# Patient Record
Sex: FEMALE | Race: BLACK OR AFRICAN AMERICAN | NOT HISPANIC OR LATINO
[De-identification: names, ages, dates, MRNs, and addresses within clinical notes are randomized per-mention and may not be internally consistent; named-entity substitution may affect disease eponyms.]

---

## 2021-05-17 PROBLEM — Z00.00 ENCOUNTER FOR PREVENTIVE HEALTH EXAMINATION: Status: ACTIVE | Noted: 2021-05-17

## 2021-05-26 ENCOUNTER — APPOINTMENT (OUTPATIENT)
Dept: ANTEPARTUM | Facility: CLINIC | Age: 26
End: 2021-05-26
Payer: MEDICAID

## 2021-05-26 ENCOUNTER — ASOB RESULT (OUTPATIENT)
Age: 26
End: 2021-05-26

## 2021-05-26 PROCEDURE — 76812 OB US DETAILED ADDL FETUS: CPT | Mod: 59

## 2021-05-26 PROCEDURE — 76811 OB US DETAILED SNGL FETUS: CPT

## 2021-06-09 ENCOUNTER — ASOB RESULT (OUTPATIENT)
Age: 26
End: 2021-06-09

## 2021-06-09 ENCOUNTER — APPOINTMENT (OUTPATIENT)
Dept: ANTEPARTUM | Facility: CLINIC | Age: 26
End: 2021-06-09
Payer: MEDICAID

## 2021-06-09 ENCOUNTER — TRANSCRIPTION ENCOUNTER (OUTPATIENT)
Age: 26
End: 2021-06-09

## 2021-06-09 PROCEDURE — 76816 OB US FOLLOW-UP PER FETUS: CPT

## 2021-06-14 ENCOUNTER — APPOINTMENT (OUTPATIENT)
Dept: PEDIATRIC CARDIOLOGY | Facility: CLINIC | Age: 26
End: 2021-06-14
Payer: MEDICAID

## 2021-06-14 PROCEDURE — 76821 MIDDLE CEREBRAL ARTERY ECHO: CPT | Mod: 59

## 2021-06-14 PROCEDURE — 76825 ECHO EXAM OF FETAL HEART: CPT

## 2021-06-14 PROCEDURE — 76827 ECHO EXAM OF FETAL HEART: CPT

## 2021-06-14 PROCEDURE — 93325 DOPPLER ECHO COLOR FLOW MAPG: CPT | Mod: 59

## 2021-06-14 PROCEDURE — 99205 OFFICE O/P NEW HI 60 MIN: CPT | Mod: 25

## 2021-06-14 PROCEDURE — 76820 UMBILICAL ARTERY ECHO: CPT

## 2021-06-14 PROCEDURE — 76827 ECHO EXAM OF FETAL HEART: CPT | Mod: 59

## 2021-06-14 PROCEDURE — 76825 ECHO EXAM OF FETAL HEART: CPT | Mod: 59

## 2021-06-23 ENCOUNTER — ASOB RESULT (OUTPATIENT)
Age: 26
End: 2021-06-23

## 2021-06-23 ENCOUNTER — APPOINTMENT (OUTPATIENT)
Dept: ANTEPARTUM | Facility: CLINIC | Age: 26
End: 2021-06-23
Payer: MEDICAID

## 2021-06-23 PROCEDURE — 76819 FETAL BIOPHYS PROFIL W/O NST: CPT | Mod: 59

## 2021-07-16 ENCOUNTER — ASOB RESULT (OUTPATIENT)
Age: 26
End: 2021-07-16

## 2021-07-16 ENCOUNTER — APPOINTMENT (OUTPATIENT)
Dept: ANTEPARTUM | Facility: CLINIC | Age: 26
End: 2021-07-16
Payer: MEDICAID

## 2021-07-16 PROCEDURE — 76816 OB US FOLLOW-UP PER FETUS: CPT | Mod: 59

## 2021-07-27 ENCOUNTER — INPATIENT (INPATIENT)
Facility: HOSPITAL | Age: 26
LOS: 0 days | Discharge: HOME | DRG: 831 | End: 2021-07-28
Attending: OBSTETRICS & GYNECOLOGY | Admitting: OBSTETRICS & GYNECOLOGY
Payer: MEDICAID

## 2021-07-27 VITALS — SYSTOLIC BLOOD PRESSURE: 117 MMHG | DIASTOLIC BLOOD PRESSURE: 68 MMHG | HEART RATE: 102 BPM

## 2021-07-27 DIAGNOSIS — O47.1 FALSE LABOR AT OR AFTER 37 COMPLETED WEEKS OF GESTATION: ICD-10-CM

## 2021-07-27 RX ORDER — ACETAMINOPHEN 500 MG
975 TABLET ORAL ONCE
Refills: 0 | Status: COMPLETED | OUTPATIENT
Start: 2021-07-27 | End: 2021-07-27

## 2021-07-27 RX ORDER — SODIUM CHLORIDE 9 MG/ML
1000 INJECTION, SOLUTION INTRAVENOUS ONCE
Refills: 0 | Status: COMPLETED | OUTPATIENT
Start: 2021-07-27 | End: 2021-07-27

## 2021-07-27 RX ADMIN — Medication 975 MILLIGRAM(S): at 23:45

## 2021-07-27 RX ADMIN — SODIUM CHLORIDE 1000 MILLILITER(S): 9 INJECTION, SOLUTION INTRAVENOUS at 23:45

## 2021-07-27 NOTE — OB PROVIDER H&P - HISTORY OF PRESENT ILLNESS
25y  @26wks w/ Mono/Di PRABHJOT presents as a transfer from [facility].  Patient initially presented to [facility] with lower back and abdominal pain x1 day.  Evaluation of  labor revealed a 1cm cervix, unchanged after re-evaluation and no contractions on toco.  Patient reported resolution of abd pain symptoms after examination.  During evaluation patient found to be febrile to 100.6 and Covid result tested positive.  Patient was asymptomatic, reported no subjective fevers, chills, lightheadedness, chest pain, SOB, nausea, vomiting, changes in urination or bowel function.  Received tylenol, 2L fluid bolus, zofran prior to transfer.    Patient transferred to [facility] for further evaluation.  Upon arrival patient reports no acute complaints.  No difficulty breathing and no obstetrical complaints.    PNC: previously uncomplicated  Sono ( @ [place]): anterior placenta, CL 3.19cm, Baby A breech 945g, Baby B breech 807g    OBHx: 2019  6#9, 2020 SAB  GYN: denies  PMH: denies  PSH: denies  Meds: denies  Allergies: NKDA

## 2021-07-27 NOTE — OB RN TRIAGE NOTE - NS_GBS_INFANT_INVASIVE_OBGYN_ALL_OB_FT
Patient states no history
no loss of consciousness, no gait abnormality, no headache, no sensory deficits, and no weakness.

## 2021-07-27 NOTE — OB PROVIDER TRIAGE NOTE - HISTORY OF PRESENT ILLNESS
25y  @26wks w/ Mono/Di PRABHJOT presents as a transfer from [facility].  Patient initially presented to [facility] with lower back and abdominal pain x1 day.  Evaluation of  labor revealed a 1cm cervix, unchanged after re-evaluation and no contractions on toco.  Patient reported resolution of abd pain symptoms after examination.  During evaluation patient found to be febrile to 100.6 and Covid result tested positive.  Patient was asymptomatic, reported no subjective fevers, chills, lightheadedness, chest pain, SOB, nausea, vomiting, changes in urination or bowel function.   25y  @26wks w/ Mono/Di PRABHJOT presents as a transfer from [facility].  Patient initially presented to [facility] with lower back and abdominal pain x1 day.  Evaluation of  labor revealed a 1cm cervix, unchanged after re-evaluation and no contractions on toco.  Patient reported resolution of abd pain symptoms after examination.  During evaluation patient found to be febrile to 100.6 and Covid result tested positive.  Patient was asymptomatic, reported no subjective fevers, chills, lightheadedness, chest pain, SOB, nausea, vomiting, changes in urination or bowel function.  Received tylenol, 2L fluid bolus, zofran prior to transfer.    Patient transferred to [facility] for further evaluation.  Upon arrival patient reports no acute complaints.  No difficulty breathing and no obstetrical complaints.    PNC: previously uncomplicated  Sono ( @ [place]): anterior placenta, CL 3.19cm, Baby A breech 945g, Baby B breech 807g    OBHx: 2019  6#9, 2020 SAB  GYN: denies  PMH: denies  PSH: denies  Meds: denies  Allergies: NKDA

## 2021-07-27 NOTE — OB PROVIDER TRIAGE NOTE - NSOBPROVIDERNOTE_OBGYN_ALL_OB_FT
25y  @26wks w/ Mono/Di TIUP transfer from [facility] after being evaluated for PTL.  No concern for PTL, however patient found to be Covid positive with noted fever at 100.6, vital signs otherwise stable.  Patient received fluid resuscitation and transferred to [facility] for further care.  VSS on arrival.  Patient with no acute complaints and breathing comfortably on room air.  Fetal status reassuring.  Mild disease is defined as flu-like symptoms, such as fever, cough, myalgias, and anosmia without dyspnea, shortness of breath, or abnormal chest imaging.  No indication for inpatient hospitalization at this time.    Plan:  - continue to monitor overnight for worsening signs/symptoms  - Tylenol PRN fever  - IV fluid resuscitation as needed  - Monoclonal antibodies indicated in mil-mod COVID-19 disease weighing at least 40kg and who are at high risk for severe disease, defined in these cohorts as patients who meet at least one of the following:       - BMI >35       - chronic kidney disease       - diabetes       - immunosuppressive treatment    d[facility] Peter [facility]

## 2021-07-27 NOTE — OB PROVIDER H&P - TIME BILLING
/Attendiny/o  @26.1wks w/ mono/di twins and was being evaluated and r/o for PTL and was transferred from [facility]. Pt admitted 2nd to symptomatic COVID with fevers.  I have read and reviewed the above Resident's note, reviewed the chart, and pt discussed with MFM and I agree with the assessment and plan.    Pt denies any complaints at this time.  VSS and currently afebrile; Tmax 100.9 @11:20pm on .    Plan is to continue to monitor on L&D 2nd to fevers and to monitor for any symptoms or worsening; Tylenol for fever as needed.  Pt to also get sono this am and to be seen by M.    Dr. Mei

## 2021-07-27 NOTE — OB PROVIDER H&P - ASSESSMENT
25y  @26wks w/ Mono/Di TIUP transfer from [facility] after being evaluated for PTL.  No concern for PTL, however patient found to be Covid positive with noted fever at 100.6, vital signs otherwise stable.  Patient received fluid resuscitation and transferred to [facility] for further care.  VSS on arrival.  Patient with no acute complaints and breathing comfortably on room air.  Fetal status reassuring.  Mild disease is defined as flu-like symptoms, such as fever, cough, myalgias, and anosmia without dyspnea, shortness of breath, or abnormal chest imaging.  No indication for inpatient hospitalization at this time.    Plan:  - continue to monitor overnight for worsening signs/symptoms  - Tylenol PRN fever  - IV fluid resuscitation as needed  - Monoclonal antibodies indicated in mil-mod COVID-19 disease weighing at least 40kg and who are at high risk for severe disease, defined in these cohorts as patients who meet at least one of the following:       - BMI >35       - chronic kidney disease       - diabetes       - immunosuppressive treatment    d/w Dr. Peter, Dr. Vasquez and Dr. Pam Araya, PGY3 25y  @26wks w/ Mono/Di TIUP transfer from [facility] after being evaluated for PTL.  No concern for PTL, however patient found to be Covid positive with noted fever at 100.6, vital signs otherwise stable.  Patient received fluid resuscitation and transferred to [facility] for further care.  VSS on arrival.  Patient with no acute complaints and breathing comfortably on room air.  Fetal status reassuring.  Mild disease is defined as flu-like symptoms, such as fever, cough, myalgias, and anosmia without dyspnea, shortness of breath, or abnormal chest imaging.  No indication for inpatient hospitalization at this time.    Plan:  - continue to monitor overnight for worsening signs/symptoms  - Tylenol PRN fever  - IV fluid resuscitation as needed  -Pt possible candidate Monoclonal antibodies indicated in mil-mod COVID-19 disease weighing at least 40kg and who are at high risk for severe disease, defined in these cohorts as patients who meet at least one of the following:       - BMI >35       - chronic kidney disease       - diabetes       - immunosuppressive treatment    d/w Dr. Peter, Dr. Vasquez and Dr. Pam Araya, PGY3

## 2021-07-28 ENCOUNTER — TRANSCRIPTION ENCOUNTER (OUTPATIENT)
Age: 26
End: 2021-07-28

## 2021-07-28 ENCOUNTER — APPOINTMENT (OUTPATIENT)
Dept: ANTEPARTUM | Facility: CLINIC | Age: 26
End: 2021-07-28

## 2021-07-28 ENCOUNTER — ASOB RESULT (OUTPATIENT)
Age: 26
End: 2021-07-28

## 2021-07-28 VITALS — HEART RATE: 100 BPM | TEMPERATURE: 100 F | SYSTOLIC BLOOD PRESSURE: 97 MMHG | DIASTOLIC BLOOD PRESSURE: 66 MMHG

## 2021-07-28 LAB
ALBUMIN SERPL ELPH-MCNC: 3 G/DL — LOW (ref 3.3–5)
ALP SERPL-CCNC: 102 U/L — SIGNIFICANT CHANGE UP (ref 40–120)
ALT FLD-CCNC: 18 U/L — SIGNIFICANT CHANGE UP (ref 10–45)
ANION GAP SERPL CALC-SCNC: 11 MMOL/L — SIGNIFICANT CHANGE UP (ref 5–17)
AST SERPL-CCNC: 28 U/L — SIGNIFICANT CHANGE UP (ref 10–40)
BILIRUB SERPL-MCNC: 0.4 MG/DL — SIGNIFICANT CHANGE UP (ref 0.2–1.2)
BLD GP AB SCN SERPL QL: POSITIVE — SIGNIFICANT CHANGE UP
BUN SERPL-MCNC: <4 MG/DL — LOW (ref 7–23)
CALCIUM SERPL-MCNC: 8.8 MG/DL — SIGNIFICANT CHANGE UP (ref 8.4–10.5)
CHLORIDE SERPL-SCNC: 102 MMOL/L — SIGNIFICANT CHANGE UP (ref 96–108)
CO2 SERPL-SCNC: 20 MMOL/L — LOW (ref 22–31)
COVID-19 SPIKE DOMAIN AB INTERP: NEGATIVE — SIGNIFICANT CHANGE UP
COVID-19 SPIKE DOMAIN ANTIBODY RESULT: 0.4 U/ML — SIGNIFICANT CHANGE UP
CREAT SERPL-MCNC: 0.57 MG/DL — SIGNIFICANT CHANGE UP (ref 0.5–1.3)
GLUCOSE SERPL-MCNC: 95 MG/DL — SIGNIFICANT CHANGE UP (ref 70–99)
HCT VFR BLD CALC: 28 % — LOW (ref 34.5–45)
HGB BLD-MCNC: 9.5 G/DL — LOW (ref 11.5–15.5)
HIV 1 & 2 AB SERPL IA.RAPID: SIGNIFICANT CHANGE UP
MCHC RBC-ENTMCNC: 33.9 GM/DL — SIGNIFICANT CHANGE UP (ref 32–36)
MCHC RBC-ENTMCNC: 34.2 PG — HIGH (ref 27–34)
MCV RBC AUTO: 100.7 FL — HIGH (ref 80–100)
NRBC # BLD: 0 /100 WBCS — SIGNIFICANT CHANGE UP (ref 0–0)
PLATELET # BLD AUTO: 191 K/UL — SIGNIFICANT CHANGE UP (ref 150–400)
POTASSIUM SERPL-MCNC: 3.3 MMOL/L — LOW (ref 3.5–5.3)
POTASSIUM SERPL-SCNC: 3.3 MMOL/L — LOW (ref 3.5–5.3)
PROT SERPL-MCNC: 6 G/DL — SIGNIFICANT CHANGE UP (ref 6–8.3)
RBC # BLD: 2.78 M/UL — LOW (ref 3.8–5.2)
RBC # FLD: 13 % — SIGNIFICANT CHANGE UP (ref 10.3–14.5)
RH IG SCN BLD-IMP: POSITIVE — SIGNIFICANT CHANGE UP
SARS-COV-2 IGG+IGM SERPL QL IA: 0.4 U/ML — SIGNIFICANT CHANGE UP
SARS-COV-2 IGG+IGM SERPL QL IA: NEGATIVE — SIGNIFICANT CHANGE UP
SARS-COV-2 RNA SPEC QL NAA+PROBE: DETECTED
SODIUM SERPL-SCNC: 133 MMOL/L — LOW (ref 135–145)
TSH SERPL-MCNC: 0.21 UIU/ML — LOW (ref 0.27–4.2)
WBC # BLD: 5.89 K/UL — SIGNIFICANT CHANGE UP (ref 3.8–10.5)
WBC # FLD AUTO: 5.89 K/UL — SIGNIFICANT CHANGE UP (ref 3.8–10.5)

## 2021-07-28 PROCEDURE — 84443 ASSAY THYROID STIM HORMONE: CPT

## 2021-07-28 PROCEDURE — 85027 COMPLETE CBC AUTOMATED: CPT

## 2021-07-28 PROCEDURE — 80053 COMPREHEN METABOLIC PANEL: CPT

## 2021-07-28 PROCEDURE — 87635 SARS-COV-2 COVID-19 AMP PRB: CPT

## 2021-07-28 PROCEDURE — 93005 ELECTROCARDIOGRAM TRACING: CPT

## 2021-07-28 PROCEDURE — 86880 COOMBS TEST DIRECT: CPT

## 2021-07-28 PROCEDURE — 93010 ELECTROCARDIOGRAM REPORT: CPT

## 2021-07-28 PROCEDURE — 86870 RBC ANTIBODY IDENTIFICATION: CPT

## 2021-07-28 PROCEDURE — 86905 BLOOD TYPING RBC ANTIGENS: CPT

## 2021-07-28 PROCEDURE — 86077 PHYS BLOOD BANK SERV XMATCH: CPT

## 2021-07-28 PROCEDURE — 86850 RBC ANTIBODY SCREEN: CPT

## 2021-07-28 PROCEDURE — 86769 SARS-COV-2 COVID-19 ANTIBODY: CPT

## 2021-07-28 PROCEDURE — 86901 BLOOD TYPING SEROLOGIC RH(D): CPT

## 2021-07-28 PROCEDURE — 59025 FETAL NON-STRESS TEST: CPT

## 2021-07-28 PROCEDURE — 86922 COMPATIBILITY TEST ANTIGLOB: CPT

## 2021-07-28 PROCEDURE — 86900 BLOOD TYPING SEROLOGIC ABO: CPT

## 2021-07-28 PROCEDURE — 86703 HIV-1/HIV-2 1 RESULT ANTBDY: CPT

## 2021-07-28 RX ORDER — SODIUM CHLORIDE 9 MG/ML
1000 INJECTION, SOLUTION INTRAVENOUS
Refills: 0 | Status: DISCONTINUED | OUTPATIENT
Start: 2021-07-28 | End: 2021-07-28

## 2021-07-28 RX ADMIN — Medication 975 MILLIGRAM(S): at 00:45

## 2021-07-28 NOTE — DISCHARGE NOTE ANTEPARTUM - CARE PLAN
Principal Discharge DX:	COVID-19 affecting pregnancy in second trimester  Goal:	Recover  Assessment and plan of treatment:	Continue with symptomatic management  - tylenol PRN for fevers  - If you have any chest pain or shortness of breath please contact your provider.

## 2021-07-28 NOTE — CHART NOTE - NSCHARTNOTEFT_GEN_A_CORE
Requested by OB to consult on a 26yo  @26w1d twin gestation transfer from [facility] after being r/o for PTL found to be COVID+ and febrile and tachycardic.  Mother was consulted over the phone and as follows:     1. NICU team is going to be present at the time of delivery, and her babies will be place under the warmer and immediately evaluated.    2. Due to severe prematurity the babies will need respiratory support. The babies will require respiratory support either in the form of CPAP, intubation, surfactant administration and mechanical ventilation. Due to prematurity of the lungs, there is a likelihood that the babies could sustain damage to the lungs while on the ventilator, but that all precautions will be taken to try to minimize the effects. The babies is also at risk for chronic lung disease.    3. The heart may not be strong enough to maintain normal blood pressures. IV fluid and  medications will be given to help maintain normal blood pressure as needed.     5. Due to the size of the babies and prematurity, they will need multiple blood transfusions just from routine blood draws and that the baby will not be producing enough of their own blood.   In order to have IV access, central lines will be placed through the umbilical vessels  and later long term lines such as PICC lines would be needed. Through these same lines the babies would receive IV fluids, medications and nutrition.    6. Due to severe prematurity of the babies their immune system will not be strong enough to fight infections. They will be screened for infections and treated with antibiotics as needed. They may receive other courses of antibiotics during their hospital course if an infection is suspected.     7. Due to severe prematurity, feeding will not be initiated right away. In the mean time, colostrum care is provided. Once feeds are initiated, it will be given through an orogastric tube and feeds will be advanced slowly as tolerated. The importance of maternal breast milk as the exclusive source of nutrition was discussed at length    8. The potential for bleeding was discussed with an emphasis on bleeding in the brain. The fragility of the vessels in the brain at this gestation is severe and that with small changes in blood pressure could result in bleeding. This bleeding could cause developmental delays and morbidity. This type of bleeding will be screened with interval head ultrasounds.    10. The babies are expected to remain in the NICU for at least until they are closer to full term but largely depends on the course and complications. Expected length of stay is about 10 - 12 weeks.    Mrs. Gallegos had the chance to ask questions and all questions were answered. Should she have more questions, she was encouraged to contact the NICU      Thank you for the consult and please inform us of any changes in her status Requested by OB to consult on a 24yo  @26w1d twin gestation transfer from [facility] after being r/o for PTL found to be COVID+ and febrile and tachycardic.  Mother was consulted over the phone and as follows:    1. NICU team is going to be present at the time of delivery, and her babies will be place under the warmer and immediately evaluated.    2. Due to prematurity of their lungs, the babies will require respiratory support either in the form of CPAP, or intubation, surfactant administration and mechanical ventilation. There is a likelihood that the babies could sustain damage to the lungs while on the ventilator, but that all precautions will be taken to try to minimize the effects. The babies are also at risk for chronic lung disease.    3. The heart may not be strong enough to maintain normal blood pressures. IV fluid and  medications will be given to help maintain normal blood pressure as needed.     4. Due to prematurity and not be able to produce enough blood cells and also from routine blood draws, they are at risk for significant anemia that may require blood transfusion.     5. In order to have IV access, central lines will be placed through the umbilical vessels  and later on long term lines such as PICC lines would be needed. Through these same lines the babies would receive IV fluids, medications and nutrition.    6. The babies may need to be screened for infection as need and treated. They may receive other courses of antibiotics during their hospital course if an infection is suspected.     7. Due to severe prematurity, feeding will not be initiated right away. In the mean time, colostrum care will be provided. Once feeds are initiated, it will be given through an orogastric tube and feeds will be advanced slowly as tolerated. The importance of maternal breast milk as the exclusive source of nutrition was discussed at length    8. The potential for bleeding was discussed with an emphasis on bleeding in the brain. Due to fragility of blood vessels at this gestation, small changes in blood pressure could result in bleeding. This bleeding could cause developmental delays and morbidity. This type of bleeding will be screened with interval head ultrasounds.    9. The babies are expected to remain in the NICU for at least until they are closer to full term but largely depends on the course and complications. Expected length of stay is about 10 - 12 weeks.    Mrs. Gallegos had the chance to ask questions and all questions were answered. Should she have more questions, she was encouraged to contact the NICU      Thank you for the consult and please inform us of any changes in her status

## 2021-07-28 NOTE — DISCHARGE NOTE ANTEPARTUM - MEDICATION SUMMARY - MEDICATIONS TO TAKE
I will START or STAY ON the medications listed below when I get home from the hospital:    Prenatal Multivitamins with Folic Acid 1 mg oral tablet  -- 1 tab(s) by mouth once a day  -- Indication: For Pregnancy

## 2021-07-28 NOTE — CHART NOTE - NSCHARTNOTEFT_GEN_A_CORE
OB Chart Note    A: BPP 8/8 breech MVP 3.4  B: BPP 8/8 breech MVP 4.2    Pt cleared for discharge  dw Dr.Hirschberg Daniil Khaitov PGY3

## 2021-07-28 NOTE — CHART NOTE - NSCHARTNOTEFT_GEN_A_CORE
R3 Chart Note    Patient re-evaluated at bedside after noted T 38.3 @ 2321.  Patient remains tachycardic w/ -120.  EKG sinus tachycardia.  Resting comfortably in bed with no new symptomatic complaints.    Discussed case w/ Medicine hospitalist who recommended checking TSH value.  Medicine team aware of patient, and formal consult will be placed if change in clinical status.    Plan:  - c/w IV fluid hydration  - Tylenol PRN  - monitor VS  - f/u labs including CBC, CMP, TSH, T&S    d/w Dr. Rome Araya, PGY3

## 2021-07-28 NOTE — PROGRESS NOTE ADULT - ASSESSMENT
24yo  @26w1d transfer from [facility] after being r/o for PTL found to be COVID+ and febrile, tachycardic and fluid responsive. Currently saturating well on room air with no acute complaints.  - Tylenol PRN for fever  - LR@125  - AM MFM consult  - Continuous pulse ox  - Reg Diet    Bill Chase PGY3    26yo  @26w1d transfer from [facility] after being r/o for PTL found to be COVID+ and febrile, tachycardic and fluid responsive. Currently saturating well on room air with no acute complaints.  - Tylenol PRN for fever  - LR@125  - AM MFM consult  - Continuous pulse ox  - Reg Diet    Bill Chase PGY3     *** MFM Assessment ***   Patient seen and examined with Dr. Orozco. 26yo  @26w1d with mono-di twins, transfer from [facility] after being r/o for PTL found to be COVID+ and febrile, tachycardic and fluid responsive. Patient afebrile at this time, satting 100% on RA and with no complaints. She states that overall she is feeling very well. She denies any contractions, VB or LOF. Denies any difficulty breathing or chest pain and endorses good fetal movement. BPP at bedside 8/8 X 2 and 2 fetal bladders noted. Last growth Patient to be discharged home with outpatient follow up.     Carly Hirschberg, JIM Fellow

## 2021-07-28 NOTE — DISCHARGE NOTE ANTEPARTUM - PLAN OF CARE
Recover Continue with symptomatic management  - tylenol PRN for fevers  - If you have any chest pain or shortness of breath please contact your provider.

## 2021-07-28 NOTE — DISCHARGE NOTE ANTEPARTUM - HOSPITAL COURSE
25y  @26wks w/ Mono/Di TIUP transfer from [facility] after being evaluated for PTL.  No concern for PTL, however patient found to be Covid positive with noted fever at 100.6, vital signs otherwise stable.  Patient received fluid resuscitation and transferred to [facility] for further care.  VSS on arrival.  Patient with no acute complaints and breathing comfortably on room air.  Fetal status reassuring. Overnight patient was asymptomatic and VSS. Plan for outpatient management of COVID.

## 2021-07-28 NOTE — DISCHARGE NOTE ANTEPARTUM - CARE PROVIDER_API CALL
Tucker Ruiz  Obstetrics and Gynecology  [address] [place] [address]  Phone: [phone]  Fax: [phone]  Follow Up Time: 2 weeks

## 2021-07-28 NOTE — DISCHARGE NOTE ANTEPARTUM - PATIENT PORTAL LINK FT
You can access the FollowMyHealth Patient Portal offered by [facility] by registering at the following website: [email or web address]. By joining [employer]’s FollowMyHealth portal, you will also be able to view your health information using other applications (apps) compatible with our system.

## 2021-07-29 ENCOUNTER — NON-APPOINTMENT (OUTPATIENT)
Age: 26
End: 2021-07-29

## 2021-07-29 RX ORDER — ACETAMINOPHEN 500 MG/1
500 TABLET ORAL
Refills: 0 | Status: ACTIVE | COMMUNITY
Start: 2021-07-29

## 2021-08-09 ENCOUNTER — APPOINTMENT (OUTPATIENT)
Dept: ANTEPARTUM | Facility: CLINIC | Age: 26
End: 2021-08-09
Payer: MEDICAID

## 2021-08-09 PROCEDURE — 76816 OB US FOLLOW-UP PER FETUS: CPT

## 2021-08-17 ENCOUNTER — INPATIENT (INPATIENT)
Facility: HOSPITAL | Age: 26
LOS: 0 days | Discharge: HOME | DRG: 833 | End: 2021-08-18
Attending: OBSTETRICS & GYNECOLOGY | Admitting: OBSTETRICS & GYNECOLOGY
Payer: COMMERCIAL

## 2021-08-17 ENCOUNTER — ASOB RESULT (OUTPATIENT)
Age: 26
End: 2021-08-17

## 2021-08-17 ENCOUNTER — APPOINTMENT (OUTPATIENT)
Dept: ANTEPARTUM | Facility: CLINIC | Age: 26
End: 2021-08-17
Payer: MEDICAID

## 2021-08-17 VITALS
HEART RATE: 85 BPM | TEMPERATURE: 98 F | DIASTOLIC BLOOD PRESSURE: 69 MMHG | SYSTOLIC BLOOD PRESSURE: 103 MMHG | RESPIRATION RATE: 18 BRPM

## 2021-08-17 DIAGNOSIS — O26.899 OTHER SPECIFIED PREGNANCY RELATED CONDITIONS, UNSPECIFIED TRIMESTER: ICD-10-CM

## 2021-08-17 DIAGNOSIS — Z3A.00 WEEKS OF GESTATION OF PREGNANCY NOT SPECIFIED: ICD-10-CM

## 2021-08-17 LAB
APPEARANCE UR: CLEAR — SIGNIFICANT CHANGE UP
BACTERIA # UR AUTO: PRESENT /HPF
BASOPHILS # BLD AUTO: 0.04 K/UL — SIGNIFICANT CHANGE UP (ref 0–0.2)
BASOPHILS NFR BLD AUTO: 0.6 % — SIGNIFICANT CHANGE UP (ref 0–2)
BILIRUB UR-MCNC: ABNORMAL
BLD GP AB SCN SERPL QL: NEGATIVE — SIGNIFICANT CHANGE UP
COLOR SPEC: ABNORMAL
COMMENT - URINE: SIGNIFICANT CHANGE UP
DIFF PNL FLD: NEGATIVE — SIGNIFICANT CHANGE UP
EOSINOPHIL # BLD AUTO: 0.1 K/UL — SIGNIFICANT CHANGE UP (ref 0–0.5)
EOSINOPHIL NFR BLD AUTO: 1.4 % — SIGNIFICANT CHANGE UP (ref 0–6)
EPI CELLS # UR: ABNORMAL /HPF (ref 0–5)
GLUCOSE UR QL: NEGATIVE — SIGNIFICANT CHANGE UP
HCT VFR BLD CALC: 30.8 % — LOW (ref 34.5–45)
HGB BLD-MCNC: 10.4 G/DL — LOW (ref 11.5–15.5)
IMM GRANULOCYTES NFR BLD AUTO: 0.3 % — SIGNIFICANT CHANGE UP (ref 0–1.5)
KETONES UR-MCNC: 15 MG/DL
LEUKOCYTE ESTERASE UR-ACNC: NEGATIVE — SIGNIFICANT CHANGE UP
LYMPHOCYTES # BLD AUTO: 1.84 K/UL — SIGNIFICANT CHANGE UP (ref 1–3.3)
LYMPHOCYTES # BLD AUTO: 25.8 % — SIGNIFICANT CHANGE UP (ref 13–44)
MCHC RBC-ENTMCNC: 33.7 PG — SIGNIFICANT CHANGE UP (ref 27–34)
MCHC RBC-ENTMCNC: 33.8 GM/DL — SIGNIFICANT CHANGE UP (ref 32–36)
MCV RBC AUTO: 99.7 FL — SIGNIFICANT CHANGE UP (ref 80–100)
MONOCYTES # BLD AUTO: 0.47 K/UL — SIGNIFICANT CHANGE UP (ref 0–0.9)
MONOCYTES NFR BLD AUTO: 6.6 % — SIGNIFICANT CHANGE UP (ref 2–14)
NEUTROPHILS # BLD AUTO: 4.66 K/UL — SIGNIFICANT CHANGE UP (ref 1.8–7.4)
NEUTROPHILS NFR BLD AUTO: 65.3 % — SIGNIFICANT CHANGE UP (ref 43–77)
NITRITE UR-MCNC: SIGNIFICANT CHANGE UP
NRBC # BLD: 0 /100 WBCS — SIGNIFICANT CHANGE UP (ref 0–0)
PH UR: 7 — SIGNIFICANT CHANGE UP (ref 5–8)
PLATELET # BLD AUTO: 239 K/UL — SIGNIFICANT CHANGE UP (ref 150–400)
PROT UR-MCNC: ABNORMAL MG/DL
RBC # BLD: 3.09 M/UL — LOW (ref 3.8–5.2)
RBC # FLD: 12.8 % — SIGNIFICANT CHANGE UP (ref 10.3–14.5)
RBC CASTS # UR COMP ASSIST: < 5 /HPF — SIGNIFICANT CHANGE UP
RH IG SCN BLD-IMP: POSITIVE — SIGNIFICANT CHANGE UP
SP GR SPEC: 1.02 — SIGNIFICANT CHANGE UP (ref 1–1.03)
UROBILINOGEN FLD QL: >=8 E.U./DL
WBC # BLD: 7.13 K/UL — SIGNIFICANT CHANGE UP (ref 3.8–10.5)
WBC # FLD AUTO: 7.13 K/UL — SIGNIFICANT CHANGE UP (ref 3.8–10.5)
WBC UR QL: < 5 /HPF — SIGNIFICANT CHANGE UP

## 2021-08-17 PROCEDURE — 76819 FETAL BIOPHYS PROFIL W/O NST: CPT

## 2021-08-17 RX ORDER — ACETAMINOPHEN 500 MG
650 TABLET ORAL EVERY 6 HOURS
Refills: 0 | Status: DISCONTINUED | OUTPATIENT
Start: 2021-08-17 | End: 2021-08-18

## 2021-08-17 RX ORDER — FOLIC ACID 0.8 MG
1 TABLET ORAL ONCE
Refills: 0 | Status: DISCONTINUED | OUTPATIENT
Start: 2021-08-17 | End: 2021-08-17

## 2021-08-17 RX ORDER — FERROUS SULFATE 325(65) MG
325 TABLET ORAL ONCE
Refills: 0 | Status: DISCONTINUED | OUTPATIENT
Start: 2021-08-17 | End: 2021-08-17

## 2021-08-17 RX ORDER — SODIUM CHLORIDE 9 MG/ML
1000 INJECTION, SOLUTION INTRAVENOUS
Refills: 0 | Status: DISCONTINUED | OUTPATIENT
Start: 2021-08-17 | End: 2021-08-18

## 2021-08-17 RX ORDER — PHENAZOPYRIDINE HCL 100 MG
200 TABLET ORAL EVERY 8 HOURS
Refills: 0 | Status: COMPLETED | OUTPATIENT
Start: 2021-08-17 | End: 2021-08-18

## 2021-08-17 RX ADMIN — Medication 650 MILLIGRAM(S): at 22:00

## 2021-08-17 RX ADMIN — Medication 650 MILLIGRAM(S): at 20:26

## 2021-08-17 RX ADMIN — Medication 200 MILLIGRAM(S): at 20:24

## 2021-08-18 ENCOUNTER — TRANSCRIPTION ENCOUNTER (OUTPATIENT)
Age: 26
End: 2021-08-18

## 2021-08-18 VITALS
RESPIRATION RATE: 16 BRPM | OXYGEN SATURATION: 100 % | HEART RATE: 93 BPM | TEMPERATURE: 99 F | DIASTOLIC BLOOD PRESSURE: 68 MMHG | SYSTOLIC BLOOD PRESSURE: 104 MMHG

## 2021-08-18 LAB
COVID-19 SPIKE DOMAIN AB INTERP: POSITIVE
COVID-19 SPIKE DOMAIN ANTIBODY RESULT: 189 U/ML — HIGH
SARS-COV-2 IGG+IGM SERPL QL IA: 189 U/ML — HIGH
SARS-COV-2 IGG+IGM SERPL QL IA: POSITIVE
T PALLIDUM AB TITR SER: NEGATIVE — SIGNIFICANT CHANGE UP

## 2021-08-18 PROCEDURE — 87798 DETECT AGENT NOS DNA AMP: CPT

## 2021-08-18 PROCEDURE — 99214 OFFICE O/P EST MOD 30 MIN: CPT

## 2021-08-18 PROCEDURE — 86850 RBC ANTIBODY SCREEN: CPT

## 2021-08-18 PROCEDURE — 87491 CHLMYD TRACH DNA AMP PROBE: CPT

## 2021-08-18 PROCEDURE — 85025 COMPLETE CBC W/AUTO DIFF WBC: CPT

## 2021-08-18 PROCEDURE — 87186 SC STD MICRODIL/AGAR DIL: CPT

## 2021-08-18 PROCEDURE — 87081 CULTURE SCREEN ONLY: CPT

## 2021-08-18 PROCEDURE — 86900 BLOOD TYPING SEROLOGIC ABO: CPT

## 2021-08-18 PROCEDURE — 87591 N.GONORRHOEAE DNA AMP PROB: CPT

## 2021-08-18 PROCEDURE — 86901 BLOOD TYPING SEROLOGIC RH(D): CPT

## 2021-08-18 PROCEDURE — 86769 SARS-COV-2 COVID-19 ANTIBODY: CPT

## 2021-08-18 PROCEDURE — 87086 URINE CULTURE/COLONY COUNT: CPT

## 2021-08-18 PROCEDURE — 86780 TREPONEMA PALLIDUM: CPT

## 2021-08-18 PROCEDURE — 87661 TRICHOMONAS VAGINALIS AMPLIF: CPT

## 2021-08-18 PROCEDURE — 36415 COLL VENOUS BLD VENIPUNCTURE: CPT

## 2021-08-18 PROCEDURE — 81001 URINALYSIS AUTO W/SCOPE: CPT

## 2021-08-18 PROCEDURE — 87801 DETECT AGNT MULT DNA AMPLI: CPT

## 2021-08-18 RX ORDER — ONDANSETRON 8 MG/1
8 TABLET, FILM COATED ORAL ONCE
Refills: 0 | Status: COMPLETED | OUTPATIENT
Start: 2021-08-18 | End: 2021-08-18

## 2021-08-18 RX ORDER — ACETAMINOPHEN 500 MG
2 TABLET ORAL
Qty: 0 | Refills: 0 | DISCHARGE
Start: 2021-08-18

## 2021-08-18 RX ADMIN — ONDANSETRON 8 MILLIGRAM(S): 8 TABLET, FILM COATED ORAL at 16:59

## 2021-08-18 RX ADMIN — Medication 200 MILLIGRAM(S): at 06:39

## 2021-08-18 NOTE — DISCHARGE NOTE ANTEPARTUM - CARE PROVIDER_API CALL
Roxanna Montes)  Obstetrics and Gynecology  [address] [place] [address]  Phone: [phone]  Fax: [phone]  Follow Up Time:

## 2021-08-18 NOTE — DISCHARGE NOTE ANTEPARTUM - PLAN OF CARE
We monitored you in the hospital for >24hrs and did not see any signs that you are going into  labor at this time. You are at high risk of  delivery given your short cervix and the fact that you have a twin pregnancy. Please follow-up next week at our ultrasound unit and in 2 weeks with your OBGYN.   Reminders:  - Nothing in vagina - no intercourse, no tampons.  - Avoid strenuous activity and heavy lifting.  - Showering is ok.  - Call the office sooner if you develop any fever, heavy bleeding, leakage of fluid, changes to how you feel the baby move or severe pain.  Go to the closest emergency room for any of these symptoms if you are not able to contact your doctor.

## 2021-08-18 NOTE — DISCHARGE NOTE ANTEPARTUM - CARE PLAN
1 Principal Discharge DX:	Short cervix   Principal Discharge DX:	Short cervix  Assessment and plan of treatment:	We monitored you in the hospital for >24hrs and did not see any signs that you are going into  labor at this time. You are at high risk of  delivery given your short cervix and the fact that you have a twin pregnancy. Please follow-up next week at our ultrasound unit and in 2 weeks with your OBGYN.   Reminders:  - Nothing in vagina - no intercourse, no tampons.  - Avoid strenuous activity and heavy lifting.  - Showering is ok.  - Call the office sooner if you develop any fever, heavy bleeding, leakage of fluid, changes to how you feel the baby move or severe pain.  Go to the closest emergency room for any of these symptoms if you are not able to contact your doctor.

## 2021-08-18 NOTE — DISCHARGE NOTE ANTEPARTUM - HOSPITAL COURSE
Pt is a  at 29wks sent from  for short, unmeasurable cervix, and pelvic pain. Pt was monitored for over 24hrs with no signs of labor: Speculum exam notable for closed cervix, pelvic pain was present before admission, did not worsen or become intermittent in nature, was related to movement and was positional, and tocometer did not  clear contractions. No indications for repeat cervical lengths at this time. Pt is hemodynamically stable and ready for discharge home with clear, strict return precautions. Pt is reliable and presents to clinic each week. She will follow-up on Monday at our ultrasound unit for further monitoring. No indication to delivery at this time.

## 2021-08-18 NOTE — DISCHARGE NOTE ANTEPARTUM - PATIENT PORTAL LINK FT
You can access the FollowMyHealth Patient Portal offered by [facility] by registering at the following website: [email or web address]. By joining [employer]’s FollowMyHealth portal, you will also be able to view your health information using other applications (apps) compatible with our system.

## 2021-08-18 NOTE — DISCHARGE NOTE ANTEPARTUM - MEDICATION SUMMARY - MEDICATIONS TO TAKE
I will START or STAY ON the medications listed below when I get home from the hospital:    acetaminophen 325 mg oral tablet  -- 2 tab(s) by mouth every 6 hours, As needed, Mild Pain (1 - 3)  -- Indication: For Pain    Prenatal Multivitamins with Folic Acid 1 mg oral tablet  -- 1 tab(s) by mouth once a day  -- Indication: For Pregnancy

## 2021-08-19 LAB
A VAGINAE DNA VAG QL NAA+PROBE: SIGNIFICANT CHANGE UP
BVAB2 DNA VAG QL NAA+PROBE: ABNORMAL
C ALBICANS DNA VAG QL NAA+PROBE: NEGATIVE — SIGNIFICANT CHANGE UP
C GLABRATA DNA VAG QL NAA+PROBE: NEGATIVE — SIGNIFICANT CHANGE UP
C KRUSEI DNA VAG QL NAA+PROBE: NEGATIVE — SIGNIFICANT CHANGE UP
C LUSITANIAE DNA VAG QL NAA+PROBE: NEGATIVE — SIGNIFICANT CHANGE UP
C TRACH RRNA SPEC QL NAA+PROBE: NEGATIVE — SIGNIFICANT CHANGE UP
CULTURE RESULTS: SIGNIFICANT CHANGE UP
MEGA1 DNA VAG QL NAA+PROBE: ABNORMAL
N GONORRHOEA RRNA SPEC QL NAA+PROBE: NEGATIVE — SIGNIFICANT CHANGE UP
SPECIMEN SOURCE: SIGNIFICANT CHANGE UP
T VAGINALIS RRNA SPEC QL NAA+PROBE: NEGATIVE — SIGNIFICANT CHANGE UP

## 2021-08-21 DIAGNOSIS — Z79.899 OTHER LONG TERM (CURRENT) DRUG THERAPY: ICD-10-CM

## 2021-08-21 DIAGNOSIS — O26.873 CERVICAL SHORTENING, THIRD TRIMESTER: ICD-10-CM

## 2021-08-21 DIAGNOSIS — Z3A.28 28 WEEKS GESTATION OF PREGNANCY: ICD-10-CM

## 2021-08-21 DIAGNOSIS — O99.891 OTHER SPECIFIED DISEASES AND CONDITIONS COMPLICATING PREGNANCY: ICD-10-CM

## 2021-08-21 DIAGNOSIS — Z28.09 IMMUNIZATION NOT CARRIED OUT BECAUSE OF OTHER CONTRAINDICATION: ICD-10-CM

## 2021-08-21 DIAGNOSIS — R10.2 PELVIC AND PERINEAL PAIN: ICD-10-CM

## 2021-08-21 DIAGNOSIS — O30.033 TWIN PREGNANCY, MONOCHORIONIC/DIAMNIOTIC, THIRD TRIMESTER: ICD-10-CM

## 2021-08-21 DIAGNOSIS — O36.5931 MATERNAL CARE FOR OTHER KNOWN OR SUSPECTED POOR FETAL GROWTH, THIRD TRIMESTER, FETUS 1: ICD-10-CM

## 2021-08-21 DIAGNOSIS — O36.5932 MATERNAL CARE FOR OTHER KNOWN OR SUSPECTED POOR FETAL GROWTH, THIRD TRIMESTER, FETUS 2: ICD-10-CM

## 2021-08-23 ENCOUNTER — APPOINTMENT (OUTPATIENT)
Dept: ANTEPARTUM | Facility: CLINIC | Age: 26
End: 2021-08-23

## 2021-08-24 ENCOUNTER — ASOB RESULT (OUTPATIENT)
Age: 26
End: 2021-08-24

## 2021-08-24 ENCOUNTER — APPOINTMENT (OUTPATIENT)
Dept: ANTEPARTUM | Facility: CLINIC | Age: 26
End: 2021-08-24
Payer: MEDICAID

## 2021-08-24 PROCEDURE — 76816 OB US FOLLOW-UP PER FETUS: CPT

## 2021-08-24 PROCEDURE — 76819 FETAL BIOPHYS PROFIL W/O NST: CPT | Mod: 59

## 2021-09-13 ENCOUNTER — ASOB RESULT (OUTPATIENT)
Age: 26
End: 2021-09-13

## 2021-09-13 ENCOUNTER — APPOINTMENT (OUTPATIENT)
Dept: ANTEPARTUM | Facility: CLINIC | Age: 26
End: 2021-09-13
Payer: MEDICAID

## 2021-09-13 ENCOUNTER — INPATIENT (INPATIENT)
Facility: HOSPITAL | Age: 26
LOS: 1 days | Discharge: HOME | DRG: 833 | End: 2021-09-15
Attending: OBSTETRICS & GYNECOLOGY | Admitting: OBSTETRICS & GYNECOLOGY
Payer: COMMERCIAL

## 2021-09-13 VITALS
TEMPERATURE: 98 F | DIASTOLIC BLOOD PRESSURE: 71 MMHG | SYSTOLIC BLOOD PRESSURE: 110 MMHG | HEART RATE: 91 BPM | RESPIRATION RATE: 16 BRPM | OXYGEN SATURATION: 99 %

## 2021-09-13 DIAGNOSIS — O26.899 OTHER SPECIFIED PREGNANCY RELATED CONDITIONS, UNSPECIFIED TRIMESTER: ICD-10-CM

## 2021-09-13 DIAGNOSIS — Z3A.00 WEEKS OF GESTATION OF PREGNANCY NOT SPECIFIED: ICD-10-CM

## 2021-09-13 LAB
ALBUMIN SERPL ELPH-MCNC: 3.2 G/DL — LOW (ref 3.3–5)
ALP SERPL-CCNC: 227 U/L — HIGH (ref 40–120)
ALT FLD-CCNC: 68 U/L — HIGH (ref 10–45)
ANION GAP SERPL CALC-SCNC: 11 MMOL/L — SIGNIFICANT CHANGE UP (ref 5–17)
APPEARANCE UR: CLEAR — SIGNIFICANT CHANGE UP
APTT BLD: 28.4 SEC — SIGNIFICANT CHANGE UP (ref 27.5–35.5)
AST SERPL-CCNC: 134 U/L — HIGH (ref 10–40)
BASOPHILS # BLD AUTO: 0.03 K/UL — SIGNIFICANT CHANGE UP (ref 0–0.2)
BASOPHILS NFR BLD AUTO: 0.4 % — SIGNIFICANT CHANGE UP (ref 0–2)
BILIRUB SERPL-MCNC: 0.7 MG/DL — SIGNIFICANT CHANGE UP (ref 0.2–1.2)
BILIRUB UR-MCNC: NEGATIVE — SIGNIFICANT CHANGE UP
BLD GP AB SCN SERPL QL: NEGATIVE — SIGNIFICANT CHANGE UP
BUN SERPL-MCNC: 5 MG/DL — LOW (ref 7–23)
CALCIUM SERPL-MCNC: 8.9 MG/DL — SIGNIFICANT CHANGE UP (ref 8.4–10.5)
CHLORIDE SERPL-SCNC: 103 MMOL/L — SIGNIFICANT CHANGE UP (ref 96–108)
CO2 SERPL-SCNC: 21 MMOL/L — LOW (ref 22–31)
COLOR SPEC: YELLOW — SIGNIFICANT CHANGE UP
COVID-19 SPIKE DOMAIN AB INTERP: POSITIVE
COVID-19 SPIKE DOMAIN ANTIBODY RESULT: >250 U/ML — HIGH
CREAT ?TM UR-MCNC: 117 MG/DL — SIGNIFICANT CHANGE UP
CREAT SERPL-MCNC: 0.73 MG/DL — SIGNIFICANT CHANGE UP (ref 0.5–1.3)
DIFF PNL FLD: NEGATIVE — SIGNIFICANT CHANGE UP
EOSINOPHIL # BLD AUTO: 0.06 K/UL — SIGNIFICANT CHANGE UP (ref 0–0.5)
EOSINOPHIL NFR BLD AUTO: 0.7 % — SIGNIFICANT CHANGE UP (ref 0–6)
FIBRINOGEN PPP-MCNC: 390 MG/DL — SIGNIFICANT CHANGE UP (ref 258–438)
GLUCOSE SERPL-MCNC: 81 MG/DL — SIGNIFICANT CHANGE UP (ref 70–99)
GLUCOSE UR QL: NEGATIVE — SIGNIFICANT CHANGE UP
HCT VFR BLD CALC: 29.4 % — LOW (ref 34.5–45)
HGB BLD-MCNC: 10.2 G/DL — LOW (ref 11.5–15.5)
IMM GRANULOCYTES NFR BLD AUTO: 0.4 % — SIGNIFICANT CHANGE UP (ref 0–1.5)
KETONES UR-MCNC: 15 MG/DL
LEUKOCYTE ESTERASE UR-ACNC: NEGATIVE — SIGNIFICANT CHANGE UP
LYMPHOCYTES # BLD AUTO: 1.57 K/UL — SIGNIFICANT CHANGE UP (ref 1–3.3)
LYMPHOCYTES # BLD AUTO: 19.5 % — SIGNIFICANT CHANGE UP (ref 13–44)
MCHC RBC-ENTMCNC: 33.1 PG — SIGNIFICANT CHANGE UP (ref 27–34)
MCHC RBC-ENTMCNC: 34.7 GM/DL — SIGNIFICANT CHANGE UP (ref 32–36)
MCV RBC AUTO: 95.5 FL — SIGNIFICANT CHANGE UP (ref 80–100)
MONOCYTES # BLD AUTO: 0.47 K/UL — SIGNIFICANT CHANGE UP (ref 0–0.9)
MONOCYTES NFR BLD AUTO: 5.8 % — SIGNIFICANT CHANGE UP (ref 2–14)
NEUTROPHILS # BLD AUTO: 5.88 K/UL — SIGNIFICANT CHANGE UP (ref 1.8–7.4)
NEUTROPHILS NFR BLD AUTO: 73.2 % — SIGNIFICANT CHANGE UP (ref 43–77)
NITRITE UR-MCNC: NEGATIVE — SIGNIFICANT CHANGE UP
NRBC # BLD: 0 /100 WBCS — SIGNIFICANT CHANGE UP (ref 0–0)
PH UR: 5.5 — SIGNIFICANT CHANGE UP (ref 5–8)
PLATELET # BLD AUTO: 256 K/UL — SIGNIFICANT CHANGE UP (ref 150–400)
POTASSIUM SERPL-MCNC: 3.5 MMOL/L — SIGNIFICANT CHANGE UP (ref 3.5–5.3)
POTASSIUM SERPL-SCNC: 3.5 MMOL/L — SIGNIFICANT CHANGE UP (ref 3.5–5.3)
PROT ?TM UR-MCNC: 12 MG/DL — SIGNIFICANT CHANGE UP (ref 0–12)
PROT SERPL-MCNC: 6.6 G/DL — SIGNIFICANT CHANGE UP (ref 6–8.3)
PROT UR-MCNC: NEGATIVE MG/DL — SIGNIFICANT CHANGE UP
PROT/CREAT UR-RTO: 0.1 RATIO — SIGNIFICANT CHANGE UP (ref 0–0.2)
RBC # BLD: 3.08 M/UL — LOW (ref 3.8–5.2)
RBC # FLD: 12.6 % — SIGNIFICANT CHANGE UP (ref 10.3–14.5)
RH IG SCN BLD-IMP: POSITIVE — SIGNIFICANT CHANGE UP
SARS-COV-2 IGG+IGM SERPL QL IA: >250 U/ML — HIGH
SARS-COV-2 IGG+IGM SERPL QL IA: POSITIVE
SODIUM SERPL-SCNC: 135 MMOL/L — SIGNIFICANT CHANGE UP (ref 135–145)
SP GR SPEC: 1.02 — SIGNIFICANT CHANGE UP (ref 1–1.03)
URATE SERPL-MCNC: 6 MG/DL — SIGNIFICANT CHANGE UP (ref 2.5–7)
UROBILINOGEN FLD QL: 1 E.U./DL — SIGNIFICANT CHANGE UP
WBC # BLD: 8.04 K/UL — SIGNIFICANT CHANGE UP (ref 3.8–10.5)
WBC # FLD AUTO: 8.04 K/UL — SIGNIFICANT CHANGE UP (ref 3.8–10.5)

## 2021-09-13 PROCEDURE — 76816 OB US FOLLOW-UP PER FETUS: CPT | Mod: 26

## 2021-09-13 PROCEDURE — 76819 FETAL BIOPHYS PROFIL W/O NST: CPT | Mod: 26,59

## 2021-09-13 RX ORDER — TETANUS TOXOID, REDUCED DIPHTHERIA TOXOID AND ACELLULAR PERTUSSIS VACCINE, ADSORBED 5; 2.5; 8; 8; 2.5 [IU]/.5ML; [IU]/.5ML; UG/.5ML; UG/.5ML; UG/.5ML
0.5 SUSPENSION INTRAMUSCULAR ONCE
Refills: 0 | Status: COMPLETED | OUTPATIENT
Start: 2021-09-13 | End: 2021-09-13

## 2021-09-13 RX ORDER — FAMOTIDINE 10 MG/ML
20 INJECTION INTRAVENOUS ONCE
Refills: 0 | Status: COMPLETED | OUTPATIENT
Start: 2021-09-13 | End: 2021-09-13

## 2021-09-13 RX ORDER — FOLIC ACID 0.8 MG
1 TABLET ORAL ONCE
Refills: 0 | Status: COMPLETED | OUTPATIENT
Start: 2021-09-13 | End: 2021-09-13

## 2021-09-13 RX ORDER — SODIUM CHLORIDE 9 MG/ML
1000 INJECTION, SOLUTION INTRAVENOUS
Refills: 0 | Status: DISCONTINUED | OUTPATIENT
Start: 2021-09-13 | End: 2021-09-14

## 2021-09-13 RX ORDER — FERROUS SULFATE 325(65) MG
325 TABLET ORAL ONCE
Refills: 0 | Status: COMPLETED | OUTPATIENT
Start: 2021-09-13 | End: 2021-09-13

## 2021-09-13 RX ADMIN — Medication 1 MILLIGRAM(S): at 12:07

## 2021-09-13 RX ADMIN — FAMOTIDINE 20 MILLIGRAM(S): 10 INJECTION INTRAVENOUS at 17:20

## 2021-09-13 RX ADMIN — Medication 12 MILLIGRAM(S): at 04:06

## 2021-09-13 RX ADMIN — SODIUM CHLORIDE 125 MILLILITER(S): 9 INJECTION, SOLUTION INTRAVENOUS at 04:10

## 2021-09-13 RX ADMIN — SODIUM CHLORIDE 125 MILLILITER(S): 9 INJECTION, SOLUTION INTRAVENOUS at 14:49

## 2021-09-13 RX ADMIN — Medication 1 TABLET(S): at 12:12

## 2021-09-13 RX ADMIN — Medication 325 MILLIGRAM(S): at 12:08

## 2021-09-13 NOTE — PATIENT PROFILE OB - MEDICAL/SURG HX
Spoke with pt regarding her lab results and let her know that everything was stable. She stated an understanding. For Medical Surgical Hx obtained at Admission, Please see Provider H&P

## 2021-09-14 ENCOUNTER — TRANSCRIPTION ENCOUNTER (OUTPATIENT)
Age: 26
End: 2021-09-14

## 2021-09-14 LAB
ALBUMIN SERPL ELPH-MCNC: 3.3 G/DL — SIGNIFICANT CHANGE UP (ref 3.3–5)
ALP SERPL-CCNC: 215 U/L — HIGH (ref 40–120)
ALT FLD-CCNC: 54 U/L — HIGH (ref 10–45)
ANION GAP SERPL CALC-SCNC: 12 MMOL/L — SIGNIFICANT CHANGE UP (ref 5–17)
AST SERPL-CCNC: 81 U/L — HIGH (ref 10–40)
BASOPHILS # BLD AUTO: 0.02 K/UL — SIGNIFICANT CHANGE UP (ref 0–0.2)
BASOPHILS NFR BLD AUTO: 0.1 % — SIGNIFICANT CHANGE UP (ref 0–2)
BILIRUB SERPL-MCNC: 0.8 MG/DL — SIGNIFICANT CHANGE UP (ref 0.2–1.2)
BUN SERPL-MCNC: 4 MG/DL — LOW (ref 7–23)
CALCIUM SERPL-MCNC: 8.9 MG/DL — SIGNIFICANT CHANGE UP (ref 8.4–10.5)
CHLORIDE SERPL-SCNC: 101 MMOL/L — SIGNIFICANT CHANGE UP (ref 96–108)
CO2 SERPL-SCNC: 21 MMOL/L — LOW (ref 22–31)
COLLECT DURATION TIME UR: 24 HR — SIGNIFICANT CHANGE UP
CREAT SERPL-MCNC: 0.73 MG/DL — SIGNIFICANT CHANGE UP (ref 0.5–1.3)
CULTURE RESULTS: SIGNIFICANT CHANGE UP
EOSINOPHIL # BLD AUTO: 0 K/UL — SIGNIFICANT CHANGE UP (ref 0–0.5)
EOSINOPHIL NFR BLD AUTO: 0 % — SIGNIFICANT CHANGE UP (ref 0–6)
GLUCOSE SERPL-MCNC: 151 MG/DL — HIGH (ref 70–99)
HCT VFR BLD CALC: 30.6 % — LOW (ref 34.5–45)
HGB BLD-MCNC: 10.5 G/DL — LOW (ref 11.5–15.5)
IMM GRANULOCYTES NFR BLD AUTO: 0.4 % — SIGNIFICANT CHANGE UP (ref 0–1.5)
LYMPHOCYTES # BLD AUTO: 0.97 K/UL — LOW (ref 1–3.3)
LYMPHOCYTES # BLD AUTO: 7 % — LOW (ref 13–44)
MCHC RBC-ENTMCNC: 33.4 PG — SIGNIFICANT CHANGE UP (ref 27–34)
MCHC RBC-ENTMCNC: 34.3 GM/DL — SIGNIFICANT CHANGE UP (ref 32–36)
MCV RBC AUTO: 97.5 FL — SIGNIFICANT CHANGE UP (ref 80–100)
MONOCYTES # BLD AUTO: 0.51 K/UL — SIGNIFICANT CHANGE UP (ref 0–0.9)
MONOCYTES NFR BLD AUTO: 3.7 % — SIGNIFICANT CHANGE UP (ref 2–14)
NEUTROPHILS # BLD AUTO: 12.31 K/UL — HIGH (ref 1.8–7.4)
NEUTROPHILS NFR BLD AUTO: 88.8 % — HIGH (ref 43–77)
NRBC # BLD: 0 /100 WBCS — SIGNIFICANT CHANGE UP (ref 0–0)
PLATELET # BLD AUTO: 259 K/UL — SIGNIFICANT CHANGE UP (ref 150–400)
POTASSIUM SERPL-MCNC: 3.8 MMOL/L — SIGNIFICANT CHANGE UP (ref 3.5–5.3)
POTASSIUM SERPL-SCNC: 3.8 MMOL/L — SIGNIFICANT CHANGE UP (ref 3.5–5.3)
PROT 24H UR-MRATE: 99 MG/24 H — SIGNIFICANT CHANGE UP (ref 50–100)
PROT SERPL-MCNC: 6.9 G/DL — SIGNIFICANT CHANGE UP (ref 6–8.3)
RBC # BLD: 3.14 M/UL — LOW (ref 3.8–5.2)
RBC # FLD: 12.8 % — SIGNIFICANT CHANGE UP (ref 10.3–14.5)
SODIUM SERPL-SCNC: 134 MMOL/L — LOW (ref 135–145)
SPECIMEN SOURCE: SIGNIFICANT CHANGE UP
TOTAL VOLUME - 24 HOUR: 900 ML — SIGNIFICANT CHANGE UP
URINE CREATININE CALCULATION: 0.9 G/24 H — SIGNIFICANT CHANGE UP (ref 0.8–1.8)
WBC # BLD: 13.87 K/UL — HIGH (ref 3.8–10.5)
WBC # FLD AUTO: 13.87 K/UL — HIGH (ref 3.8–10.5)

## 2021-09-14 RX ORDER — POLYETHYLENE GLYCOL 3350 17 G/17G
17 POWDER, FOR SOLUTION ORAL ONCE
Refills: 0 | Status: COMPLETED | OUTPATIENT
Start: 2021-09-14 | End: 2021-09-14

## 2021-09-14 RX ORDER — ACETAMINOPHEN 500 MG
650 TABLET ORAL ONCE
Refills: 0 | Status: COMPLETED | OUTPATIENT
Start: 2021-09-14 | End: 2021-09-14

## 2021-09-14 RX ORDER — FERROUS SULFATE 325(65) MG
1 TABLET ORAL
Qty: 0 | Refills: 0 | DISCHARGE
Start: 2021-09-14

## 2021-09-14 RX ORDER — FERROUS SULFATE 325(65) MG
325 TABLET ORAL DAILY
Refills: 0 | Status: DISCONTINUED | OUTPATIENT
Start: 2021-09-14 | End: 2021-09-15

## 2021-09-14 RX ADMIN — Medication 650 MILLIGRAM(S): at 15:43

## 2021-09-14 RX ADMIN — POLYETHYLENE GLYCOL 3350 17 GRAM(S): 17 POWDER, FOR SOLUTION ORAL at 15:42

## 2021-09-14 RX ADMIN — Medication 12 MILLIGRAM(S): at 04:19

## 2021-09-14 NOTE — DISCHARGE NOTE ANTEPARTUM - CARE PROVIDER_API CALL
Yaakov Marcus)  Obstetrics and Gynecology  [address] [id number]  Phone: [phone]  Fax: [phone]  Follow Up Time:

## 2021-09-14 NOTE — DISCHARGE NOTE ANTEPARTUM - PATIENT PORTAL LINK FT
You can access the FollowMyHealth Patient Portal offered by [facility] by registering at the following website: [email or web address]. By joining [employer]’s FollowMyHealth portal, you will also be able to view your health information using other applications (apps) compatible with our system.

## 2021-09-14 NOTE — DISCHARGE NOTE ANTEPARTUM - PLAN OF CARE
Please return to [facility] for your scheduled ultrasound, Friday 9/17 at 9:30AM. Please note that your due date is 11/2/21. Return for any contractions, leakage of fluid, heavy vaginal bleeding, or reduced fetal movement. Please make an appointment at [facility] for early next week.

## 2021-09-14 NOTE — DISCHARGE NOTE ANTEPARTUM - MEDICATION SUMMARY - MEDICATIONS TO TAKE
I will START or STAY ON the medications listed below when I get home from the hospital:    ferrous sulfate 325 mg (65 mg elemental iron) oral tablet  -- 1 tab(s) by mouth once a day  -- Indication: For Weeks of gestation of pregnancy not specified    Prenatal Multivitamins with Folic Acid 1 mg oral tablet  -- 1 tab(s) by mouth once a day  -- Indication: For Weeks of gestation of pregnancy not specified

## 2021-09-14 NOTE — DISCHARGE NOTE ANTEPARTUM - HOSPITAL COURSE
26yo  at 32+5 with mono-di twins presented with contractions, r/o PTL. Pt was found to be 2/50/-3 on admission and her exam remained unchanged through her hospital course. She received BMZ. Infectious workup was negative. Fetal status was reassuring throughout. LFTs were found to be elevated at 134/68 and pt started having loose stools and vomiting with mild abdominal pain. Afebrile throughout. On the day of discharge, contractions had completely subsided and LFTs downtrended to 81/54. She had one mild range BP during this time with a 24hr urine of 99. As the LFTs were downtrending, contractions resolved, and fetal status was reassuring, pt was d/c with appointment to f/u in three days for repeat US.  24yo  at 32+5 with mono-di twins presented with contractions, r/o PTL. Pt was found to be 2/50/-3 on admission and her exam remained unchanged through her hospital course. She received BMZ. Infectious workup was negative. Fetal status was reassuring throughout. LFTs were found to be elevated at 134/68 and pt started having loose stools and vomiting with mild abdominal pain. Afebrile throughout. On the day of discharge, contractions had completely subsided and LFTs downtrended to 81/54. She had one mild range BP during this time with a 24hr urine of 99. As the LFTs were downtrending, contractions resolved, n/v and abdominal pain resolved, and fetal status was reassuring.  Pt was d/c with appointment to f/u in two days for repeat US.

## 2021-09-14 NOTE — CHART NOTE - NSCHARTNOTEFT_GEN_A_CORE
09-14-21 @ 07:00    To Whom It May Concern:    On behalf of Mr. Héctor Meadows    Please use this letter as verfication that his partner was hospitalized at [facility]. Please excuse him from work 9/14.  If any questions or concerns please call [phone].         Best Regards,    Dr. Catherine Kenyon MD.  [facility]  OB/GYN Department   [address] [place] [id number]  [phone]

## 2021-09-14 NOTE — DISCHARGE NOTE ANTEPARTUM - CARE PLAN
Principal Discharge DX:	Antepartum twin pregnancy  Assessment and plan of treatment:	Please return to [facility] for your scheduled ultrasound, Friday 9/17 at 9:30AM. Please note that your due date is 11/2/21. Return for any contractions, leakage of fluid, heavy vaginal bleeding, or reduced fetal movement. Please make an appointment at [facility] for early next week.   1

## 2021-09-15 VITALS
DIASTOLIC BLOOD PRESSURE: 53 MMHG | HEART RATE: 96 BPM | TEMPERATURE: 98 F | RESPIRATION RATE: 16 BRPM | SYSTOLIC BLOOD PRESSURE: 95 MMHG | OXYGEN SATURATION: 100 %

## 2021-09-15 LAB
ALBUMIN SERPL ELPH-MCNC: 2.9 G/DL — LOW (ref 3.3–5)
ALP SERPL-CCNC: 204 U/L — HIGH (ref 40–120)
ALT FLD-CCNC: 45 U/L — SIGNIFICANT CHANGE UP (ref 10–45)
ANION GAP SERPL CALC-SCNC: 10 MMOL/L — SIGNIFICANT CHANGE UP (ref 5–17)
AST SERPL-CCNC: 59 U/L — HIGH (ref 10–40)
BASOPHILS # BLD AUTO: 0.02 K/UL — SIGNIFICANT CHANGE UP (ref 0–0.2)
BASOPHILS NFR BLD AUTO: 0.2 % — SIGNIFICANT CHANGE UP (ref 0–2)
BILIRUB SERPL-MCNC: 0.7 MG/DL — SIGNIFICANT CHANGE UP (ref 0.2–1.2)
BUN SERPL-MCNC: 3 MG/DL — LOW (ref 7–23)
CALCIUM SERPL-MCNC: 8.7 MG/DL — SIGNIFICANT CHANGE UP (ref 8.4–10.5)
CHLORIDE SERPL-SCNC: 104 MMOL/L — SIGNIFICANT CHANGE UP (ref 96–108)
CO2 SERPL-SCNC: 24 MMOL/L — SIGNIFICANT CHANGE UP (ref 22–31)
CREAT SERPL-MCNC: 0.84 MG/DL — SIGNIFICANT CHANGE UP (ref 0.5–1.3)
CULTURE RESULTS: SIGNIFICANT CHANGE UP
EOSINOPHIL # BLD AUTO: 0 K/UL — SIGNIFICANT CHANGE UP (ref 0–0.5)
EOSINOPHIL NFR BLD AUTO: 0 % — SIGNIFICANT CHANGE UP (ref 0–6)
GLUCOSE SERPL-MCNC: 118 MG/DL — HIGH (ref 70–99)
HCT VFR BLD CALC: 29.7 % — LOW (ref 34.5–45)
HGB BLD-MCNC: 9.7 G/DL — LOW (ref 11.5–15.5)
IMM GRANULOCYTES NFR BLD AUTO: 0.7 % — SIGNIFICANT CHANGE UP (ref 0–1.5)
LYMPHOCYTES # BLD AUTO: 1.54 K/UL — SIGNIFICANT CHANGE UP (ref 1–3.3)
LYMPHOCYTES # BLD AUTO: 15.6 % — SIGNIFICANT CHANGE UP (ref 13–44)
MCHC RBC-ENTMCNC: 32.2 PG — SIGNIFICANT CHANGE UP (ref 27–34)
MCHC RBC-ENTMCNC: 32.7 GM/DL — SIGNIFICANT CHANGE UP (ref 32–36)
MCV RBC AUTO: 98.7 FL — SIGNIFICANT CHANGE UP (ref 80–100)
MONOCYTES # BLD AUTO: 1.11 K/UL — HIGH (ref 0–0.9)
MONOCYTES NFR BLD AUTO: 11.2 % — SIGNIFICANT CHANGE UP (ref 2–14)
NEUTROPHILS # BLD AUTO: 7.13 K/UL — SIGNIFICANT CHANGE UP (ref 1.8–7.4)
NEUTROPHILS NFR BLD AUTO: 72.3 % — SIGNIFICANT CHANGE UP (ref 43–77)
NRBC # BLD: 0 /100 WBCS — SIGNIFICANT CHANGE UP (ref 0–0)
PLATELET # BLD AUTO: 242 K/UL — SIGNIFICANT CHANGE UP (ref 150–400)
POTASSIUM SERPL-MCNC: 4.1 MMOL/L — SIGNIFICANT CHANGE UP (ref 3.5–5.3)
POTASSIUM SERPL-SCNC: 4.1 MMOL/L — SIGNIFICANT CHANGE UP (ref 3.5–5.3)
PROT SERPL-MCNC: 6.5 G/DL — SIGNIFICANT CHANGE UP (ref 6–8.3)
RBC # BLD: 3.01 M/UL — LOW (ref 3.8–5.2)
RBC # FLD: 12.9 % — SIGNIFICANT CHANGE UP (ref 10.3–14.5)
SODIUM SERPL-SCNC: 138 MMOL/L — SIGNIFICANT CHANGE UP (ref 135–145)
SPECIMEN SOURCE: SIGNIFICANT CHANGE UP
WBC # BLD: 9.87 K/UL — SIGNIFICANT CHANGE UP (ref 3.8–10.5)
WBC # FLD AUTO: 9.87 K/UL — SIGNIFICANT CHANGE UP (ref 3.8–10.5)

## 2021-09-15 PROCEDURE — 86769 SARS-COV-2 COVID-19 ANTIBODY: CPT

## 2021-09-15 PROCEDURE — 99214 OFFICE O/P EST MOD 30 MIN: CPT

## 2021-09-15 PROCEDURE — 85730 THROMBOPLASTIN TIME PARTIAL: CPT

## 2021-09-15 PROCEDURE — 81003 URINALYSIS AUTO W/O SCOPE: CPT

## 2021-09-15 PROCEDURE — 86850 RBC ANTIBODY SCREEN: CPT

## 2021-09-15 PROCEDURE — 36415 COLL VENOUS BLD VENIPUNCTURE: CPT

## 2021-09-15 PROCEDURE — 87086 URINE CULTURE/COLONY COUNT: CPT

## 2021-09-15 PROCEDURE — 87081 CULTURE SCREEN ONLY: CPT

## 2021-09-15 PROCEDURE — 84550 ASSAY OF BLOOD/URIC ACID: CPT

## 2021-09-15 PROCEDURE — 82570 ASSAY OF URINE CREATININE: CPT

## 2021-09-15 PROCEDURE — 80053 COMPREHEN METABOLIC PANEL: CPT

## 2021-09-15 PROCEDURE — 85025 COMPLETE CBC W/AUTO DIFF WBC: CPT

## 2021-09-15 PROCEDURE — G0378: CPT

## 2021-09-15 PROCEDURE — 86901 BLOOD TYPING SEROLOGIC RH(D): CPT

## 2021-09-15 PROCEDURE — 84156 ASSAY OF PROTEIN URINE: CPT

## 2021-09-15 PROCEDURE — 86900 BLOOD TYPING SEROLOGIC ABO: CPT

## 2021-09-15 PROCEDURE — 85384 FIBRINOGEN ACTIVITY: CPT

## 2021-09-15 RX ORDER — POLYETHYLENE GLYCOL 3350 17 G/17G
17 POWDER, FOR SOLUTION ORAL EVERY 12 HOURS
Refills: 0 | Status: DISCONTINUED | OUTPATIENT
Start: 2021-09-15 | End: 2021-09-15

## 2021-09-15 RX ADMIN — POLYETHYLENE GLYCOL 3350 17 GRAM(S): 17 POWDER, FOR SOLUTION ORAL at 07:00

## 2021-09-15 NOTE — CHART NOTE - NSCHARTNOTEFT_GEN_A_CORE
09-15-21 @ 10:19    To Whom It May Concern:    On behalf of Ms. MARCOS VIEIRA,    Please use this letter as verfication that the above patient was hospitalized at [facility] from 9/13/21-9/15/21. Please excuse during this time.     If any questions or concerns please call [phone].         Best Regards,    Dr. Catherine Kenyon MD.  [facility]  OB/GYN Department   [address] [place] [address]  [phone]

## 2021-09-16 ENCOUNTER — RESULT REVIEW (OUTPATIENT)
Age: 26
End: 2021-09-16

## 2021-09-16 ENCOUNTER — INPATIENT (INPATIENT)
Facility: HOSPITAL | Age: 26
LOS: 1 days | Discharge: HOME | End: 2021-09-18
Attending: SPECIALIST | Admitting: SPECIALIST
Payer: MEDICAID

## 2021-09-16 VITALS
HEART RATE: 111 BPM | RESPIRATION RATE: 16 BRPM | TEMPERATURE: 99 F | DIASTOLIC BLOOD PRESSURE: 67 MMHG | SYSTOLIC BLOOD PRESSURE: 114 MMHG

## 2021-09-16 DIAGNOSIS — Z3A.00 WEEKS OF GESTATION OF PREGNANCY NOT SPECIFIED: ICD-10-CM

## 2021-09-16 DIAGNOSIS — O26.899 OTHER SPECIFIED PREGNANCY RELATED CONDITIONS, UNSPECIFIED TRIMESTER: ICD-10-CM

## 2021-09-16 LAB
AMPHET UR-MCNC: NEGATIVE — SIGNIFICANT CHANGE UP
BARBITURATES UR SCN-MCNC: NEGATIVE — SIGNIFICANT CHANGE UP
BASOPHILS # BLD AUTO: 0.01 K/UL — SIGNIFICANT CHANGE UP (ref 0–0.2)
BASOPHILS NFR BLD AUTO: 0.1 % — SIGNIFICANT CHANGE UP (ref 0–2)
BENZODIAZ UR-MCNC: NEGATIVE — SIGNIFICANT CHANGE UP
BLD GP AB SCN SERPL QL: NEGATIVE — SIGNIFICANT CHANGE UP
COCAINE METAB.OTHER UR-MCNC: NEGATIVE — SIGNIFICANT CHANGE UP
COVID-19 SPIKE DOMAIN AB INTERP: POSITIVE
COVID-19 SPIKE DOMAIN ANTIBODY RESULT: >250 U/ML — HIGH
CREATININE URINE RESULT, DAU: 479 MG/DL — SIGNIFICANT CHANGE UP
EOSINOPHIL # BLD AUTO: 0 K/UL — SIGNIFICANT CHANGE UP (ref 0–0.5)
EOSINOPHIL NFR BLD AUTO: 0 % — SIGNIFICANT CHANGE UP (ref 0–6)
HCT VFR BLD CALC: 31.7 % — LOW (ref 34.5–45)
HGB BLD-MCNC: 11 G/DL — LOW (ref 11.5–15.5)
IANC: 6.94 K/UL — SIGNIFICANT CHANGE UP (ref 1.5–8.5)
IMM GRANULOCYTES NFR BLD AUTO: 0.4 % — SIGNIFICANT CHANGE UP (ref 0–1.5)
LYMPHOCYTES # BLD AUTO: 1.51 K/UL — SIGNIFICANT CHANGE UP (ref 1–3.3)
LYMPHOCYTES # BLD AUTO: 15.5 % — SIGNIFICANT CHANGE UP (ref 13–44)
MCHC RBC-ENTMCNC: 33.1 PG — SIGNIFICANT CHANGE UP (ref 27–34)
MCHC RBC-ENTMCNC: 34.7 GM/DL — SIGNIFICANT CHANGE UP (ref 32–36)
MCV RBC AUTO: 95.5 FL — SIGNIFICANT CHANGE UP (ref 80–100)
METHADONE UR-MCNC: NEGATIVE — SIGNIFICANT CHANGE UP
MONOCYTES # BLD AUTO: 1.27 K/UL — HIGH (ref 0–0.9)
MONOCYTES NFR BLD AUTO: 13 % — SIGNIFICANT CHANGE UP (ref 2–14)
NEUTROPHILS # BLD AUTO: 6.94 K/UL — SIGNIFICANT CHANGE UP (ref 1.8–7.4)
NEUTROPHILS NFR BLD AUTO: 71 % — SIGNIFICANT CHANGE UP (ref 43–77)
NRBC # BLD: 0 /100 WBCS — SIGNIFICANT CHANGE UP
NRBC # FLD: 0.02 K/UL — HIGH
OPIATES UR-MCNC: NEGATIVE — SIGNIFICANT CHANGE UP
OXYCODONE UR-MCNC: NEGATIVE — SIGNIFICANT CHANGE UP
PCP SPEC-MCNC: SIGNIFICANT CHANGE UP
PCP UR-MCNC: NEGATIVE — SIGNIFICANT CHANGE UP
PLATELET # BLD AUTO: 283 K/UL — SIGNIFICANT CHANGE UP (ref 150–400)
RBC # BLD: 3.32 M/UL — LOW (ref 3.8–5.2)
RBC # FLD: 12.7 % — SIGNIFICANT CHANGE UP (ref 10.3–14.5)
RH IG SCN BLD-IMP: POSITIVE — SIGNIFICANT CHANGE UP
SARS-COV-2 IGG+IGM SERPL QL IA: >250 U/ML — HIGH
SARS-COV-2 IGG+IGM SERPL QL IA: POSITIVE
T PALLIDUM AB TITR SER: NEGATIVE — SIGNIFICANT CHANGE UP
THC UR QL: NEGATIVE — SIGNIFICANT CHANGE UP
WBC # BLD: 9.77 K/UL — SIGNIFICANT CHANGE UP (ref 3.8–10.5)
WBC # FLD AUTO: 9.77 K/UL — SIGNIFICANT CHANGE UP (ref 3.8–10.5)

## 2021-09-16 PROCEDURE — 88307 TISSUE EXAM BY PATHOLOGIST: CPT | Mod: 26

## 2021-09-16 RX ORDER — ACETAMINOPHEN 500 MG
975 TABLET ORAL
Refills: 0 | Status: DISCONTINUED | OUTPATIENT
Start: 2021-09-16 | End: 2021-09-18

## 2021-09-16 RX ORDER — LANOLIN
1 OINTMENT (GRAM) TOPICAL EVERY 6 HOURS
Refills: 0 | Status: DISCONTINUED | OUTPATIENT
Start: 2021-09-16 | End: 2021-09-18

## 2021-09-16 RX ORDER — KETOROLAC TROMETHAMINE 30 MG/ML
30 SYRINGE (ML) INJECTION ONCE
Refills: 0 | Status: DISCONTINUED | OUTPATIENT
Start: 2021-09-16 | End: 2021-09-16

## 2021-09-16 RX ORDER — OXYCODONE HYDROCHLORIDE 5 MG/1
5 TABLET ORAL ONCE
Refills: 0 | Status: DISCONTINUED | OUTPATIENT
Start: 2021-09-16 | End: 2021-09-18

## 2021-09-16 RX ORDER — IBUPROFEN 200 MG
600 TABLET ORAL EVERY 6 HOURS
Refills: 0 | Status: DISCONTINUED | OUTPATIENT
Start: 2021-09-16 | End: 2021-09-18

## 2021-09-16 RX ORDER — IBUPROFEN 200 MG
600 TABLET ORAL EVERY 6 HOURS
Refills: 0 | Status: COMPLETED | OUTPATIENT
Start: 2021-09-16 | End: 2022-08-15

## 2021-09-16 RX ORDER — OXYCODONE HYDROCHLORIDE 5 MG/1
5 TABLET ORAL
Refills: 0 | Status: DISCONTINUED | OUTPATIENT
Start: 2021-09-16 | End: 2021-09-18

## 2021-09-16 RX ORDER — DIBUCAINE 1 %
1 OINTMENT (GRAM) RECTAL EVERY 6 HOURS
Refills: 0 | Status: DISCONTINUED | OUTPATIENT
Start: 2021-09-16 | End: 2021-09-18

## 2021-09-16 RX ORDER — CITRIC ACID/SODIUM CITRATE 300-500 MG
15 SOLUTION, ORAL ORAL EVERY 6 HOURS
Refills: 0 | Status: DISCONTINUED | OUTPATIENT
Start: 2021-09-16 | End: 2021-09-16

## 2021-09-16 RX ORDER — TETANUS TOXOID, REDUCED DIPHTHERIA TOXOID AND ACELLULAR PERTUSSIS VACCINE, ADSORBED 5; 2.5; 8; 8; 2.5 [IU]/.5ML; [IU]/.5ML; UG/.5ML; UG/.5ML; UG/.5ML
0.5 SUSPENSION INTRAMUSCULAR ONCE
Refills: 0 | Status: DISCONTINUED | OUTPATIENT
Start: 2021-09-16 | End: 2021-09-18

## 2021-09-16 RX ORDER — OXYTOCIN 10 UNIT/ML
333.33 VIAL (ML) INJECTION
Qty: 20 | Refills: 0 | Status: COMPLETED | OUTPATIENT
Start: 2021-09-16 | End: 2021-09-16

## 2021-09-16 RX ORDER — SIMETHICONE 80 MG/1
80 TABLET, CHEWABLE ORAL EVERY 4 HOURS
Refills: 0 | Status: DISCONTINUED | OUTPATIENT
Start: 2021-09-16 | End: 2021-09-18

## 2021-09-16 RX ORDER — DIPHENHYDRAMINE HCL 50 MG
25 CAPSULE ORAL EVERY 6 HOURS
Refills: 0 | Status: DISCONTINUED | OUTPATIENT
Start: 2021-09-16 | End: 2021-09-18

## 2021-09-16 RX ORDER — HYDROCORTISONE 1 %
1 OINTMENT (GRAM) TOPICAL EVERY 6 HOURS
Refills: 0 | Status: DISCONTINUED | OUTPATIENT
Start: 2021-09-16 | End: 2021-09-18

## 2021-09-16 RX ORDER — SODIUM CHLORIDE 9 MG/ML
3 INJECTION INTRAMUSCULAR; INTRAVENOUS; SUBCUTANEOUS EVERY 8 HOURS
Refills: 0 | Status: DISCONTINUED | OUTPATIENT
Start: 2021-09-16 | End: 2021-09-18

## 2021-09-16 RX ORDER — AER TRAVELER 0.5 G/1
1 SOLUTION RECTAL; TOPICAL EVERY 4 HOURS
Refills: 0 | Status: DISCONTINUED | OUTPATIENT
Start: 2021-09-16 | End: 2021-09-18

## 2021-09-16 RX ORDER — PRAMOXINE HYDROCHLORIDE 150 MG/15G
1 AEROSOL, FOAM RECTAL EVERY 4 HOURS
Refills: 0 | Status: DISCONTINUED | OUTPATIENT
Start: 2021-09-16 | End: 2021-09-18

## 2021-09-16 RX ORDER — OXYTOCIN 10 UNIT/ML
333.33 VIAL (ML) INJECTION
Qty: 20 | Refills: 0 | Status: DISCONTINUED | OUTPATIENT
Start: 2021-09-16 | End: 2021-09-17

## 2021-09-16 RX ORDER — SODIUM CHLORIDE 9 MG/ML
1000 INJECTION, SOLUTION INTRAVENOUS
Refills: 0 | Status: DISCONTINUED | OUTPATIENT
Start: 2021-09-16 | End: 2021-09-16

## 2021-09-16 RX ORDER — MAGNESIUM HYDROXIDE 400 MG/1
30 TABLET, CHEWABLE ORAL
Refills: 0 | Status: DISCONTINUED | OUTPATIENT
Start: 2021-09-16 | End: 2021-09-18

## 2021-09-16 RX ORDER — FAMOTIDINE 10 MG/ML
20 INJECTION INTRAVENOUS ONCE
Refills: 0 | Status: COMPLETED | OUTPATIENT
Start: 2021-09-16 | End: 2021-09-16

## 2021-09-16 RX ORDER — INFLUENZA VIRUS VACCINE 15; 15; 15; 15 UG/.5ML; UG/.5ML; UG/.5ML; UG/.5ML
0.5 SUSPENSION INTRAMUSCULAR ONCE
Refills: 0 | Status: DISCONTINUED | OUTPATIENT
Start: 2021-09-16 | End: 2021-09-18

## 2021-09-16 RX ORDER — BENZOCAINE 10 %
1 GEL (GRAM) MUCOUS MEMBRANE EVERY 6 HOURS
Refills: 0 | Status: DISCONTINUED | OUTPATIENT
Start: 2021-09-16 | End: 2021-09-18

## 2021-09-16 RX ADMIN — Medication 30 MILLIGRAM(S): at 16:45

## 2021-09-16 RX ADMIN — SODIUM CHLORIDE 125 MILLILITER(S): 9 INJECTION, SOLUTION INTRAVENOUS at 10:14

## 2021-09-16 RX ADMIN — FAMOTIDINE 20 MILLIGRAM(S): 10 INJECTION INTRAVENOUS at 13:34

## 2021-09-16 RX ADMIN — Medication 1000 MILLIUNIT(S)/MIN: at 14:20

## 2021-09-16 RX ADMIN — Medication 975 MILLIGRAM(S): at 23:59

## 2021-09-16 RX ADMIN — SODIUM CHLORIDE 3 MILLILITER(S): 9 INJECTION INTRAMUSCULAR; INTRAVENOUS; SUBCUTANEOUS at 22:31

## 2021-09-16 RX ADMIN — Medication 30 MILLIGRAM(S): at 16:19

## 2021-09-16 NOTE — OB PROVIDER H&P - HISTORY OF PRESENT ILLNESS
PNC Provider:  Dr Ruiz @ [facility]  EDC:  2021.  Patient is a 26y/o  @ 33 2/7wks gestation with mono-di TIUP,who was found to be 4cm, transferred from [facility] for further care.   Patient states that she has been having abdominal pain since .  She was admitted at [facility] from  to 9/15/21 where she got a course of betamethasone.  States that the abdominal pain returned yesterday evening but became stronger as time passed.  She called EMS and was taken to [facility] this morning where she was found to be 4cm dilated with bulging membranes.  Denies lof or vaginal bleeding.  Patient painfully sabrina; requesting an epidural.  Reports good fetal movements x 2.  AP Course:    Twin A - Elevated umbilical dopplers prior.  Suspected SGA ( EFW - 1927/4lbs 4 oz   on 21)  Twin B - persistent right umbilical vein.  Prior elevated umbilical cord dopplers. Suspected IUGR ( EFW 1777G/3 lbs 15oz on 21)  Fetal echocardiogram on 21 - wnl  Meds:  PNV  NKDA  Reports covid-19 positive21. Vaccinated.  Spouse denies covid-19. Not vaccinated.    PMHx/PSHx/SH/Psych - denies  OB  - - 8/15/2015 - 6lbs 9oz  -SAB - 10/2020

## 2021-09-16 NOTE — CHART NOTE - NSCHARTNOTEFT_GEN_A_CORE
R3 Chart Note     Patient seen and evaluated at bedside, transferred from [place] for c/o PTL with mono-di TIUP at 33w4d. S/p epidural placement, continuing to endorse significant pressure with contractions.       PNC:   - Prenatal care at [facility], reportedly with Belén Ruiz and Jenny.    - Mono-Di TIUP, reports otherwise uncomplicated pregnancy. s/p BMZ (-) for  contractions.     ObHx:    -  2015 approximately 7lbs    - sAB x1     GynHx: Denies  PMhx: Denies  PSHx: Denies  Meds: PNV   Allergies: Denies    VS  T(C): 36.6 (21 @ 08:18)  HR: 93 (21 @ 09:41)  BP: 112/64 (21 @ 09:30)  RR: 16 (21 @ 07:36)  SpO2: 100% (21 @ 09:41)    SVE: 5.5/100/-1, bulging bag. +Bloody show.   EFM:    - A: 150, mod +accels   - B: 140, mod +accels   [place]: Ctx q2min   TAUS: Baby A: Cephalic, Baby B: Cephalic       A/P: 25y  at 33w2d admitted with PTL:    - Labor: Continue expectant management    - Fetus: Category 1 tracing x2     - Analgesia: Epidural in place, anesthesia called for possible top-off     To be d/w attending  Mary Jane, PGY-3

## 2021-09-16 NOTE — OB PROVIDER H&P - ASSESSMENT
Patient is a 26y/o  @ 33 2/7wks gestation with mono-di TIUP in PT labor.  GBS negative  Approved for an epidural.

## 2021-09-16 NOTE — OB PROVIDER TRIAGE NOTE - NSHPPHYSICALEXAM_GEN_ALL_CORE
Vital Signs  T(C): 37.1 (16 Sep 2021 07:36), Max: 37.1 (16 Sep 2021 07:36)  T(F): 98.8 (16 Sep 2021 07:36), Max: 98.8 (16 Sep 2021 07:36)  HR: 100 (16 Sep 2021 08:21) (88 - 111)  BP: 112/66 (16 Sep 2021 08:12) (112/66 - 114/67)  RR: 16 (16 Sep 2021 07:36) (16 - 16)    Abdomen gravid, soft and nontender  Continue EFM  SVE - 4.5/90/-3 Bulging membranes  TAS - vtx/vtx  GBS - Negative  EFW - Twin A - 1927/4lbs 4 oz   on 9/13/21)            Twin B -  EFW 1777G/3 lbs 15oz on 9/13/21)    PT Labor

## 2021-09-16 NOTE — OB PROVIDER TRIAGE NOTE - HISTORY OF PRESENT ILLNESS
PNC Provider:  Dr Ruiz @ [facility]  EDC:  2021.  Patient is a 26y/o  @ 33 2/7wks gestation with mono-di TIUP,who was found to be 4cm, transferred from [facility] for further care.   Patient states that she has been having abdominal pain since .  She was admitted at [facility] from  to 9/15/21 where she got a course of betamethasone.  States that the abdominal pain returned yesterday evening but became stronger as time passed.  She called EMS and was taken to [facility] this morning where she was found to be 4cm dilated with bulging membranes.  Denies lof or vaginal bleeding.  Patient painfully sabrina; requesting an epidural.  Reports good fetal movements x 2.  AP Course:    Twin A - Elevated umbilical dopplers prior.  Suspected SGA ( EFW - 1927/4lbs 4 oz   on 21)  Twin B - persistent right umbilical vein.  Prior elevated umbilical cord dopplers. Suspected IUGR ( EFW 1777G/3 lbs 15oz on 21)  Fetal echocardiogram on 21 - wnl  Meds:  PNV  NKDA  Reports covid-19 positive21. Vaccinated.  Spouse denies covid-19. Not vaccinated.    PMHx/PSHx/SH/Psych - denies  OB  - - 8/15/2015 - 6lbs 9oz  -SAB - 10/2020

## 2021-09-16 NOTE — OB NEONATOLOGY/PEDIATRICIAN DELIVERY SUMMARY - BABY A: APGAR 1 MIN COLOR, DELIVERY
Date Performed: 10/09/2017    HISTORY OF PRESENT ILLNESS:  Alden presents for preoperative medical anesthesia consultation at the request of Dr. Gutierres for upcoming right hallux cheilectomy to be performed under general anesthesia on 10/30/2017.  Patient has had chronic foot pain as documented.     REVIEW OF SYSTEMS:  Twelve point review of systems otherwise negative.    PAST MEDICAL HISTORY, PAST SURGICAL HISTORY, MEDICATIONS, ALLERGIES, SOCIAL HISTORY AND FAMILY HISTORY:  All reviewed and noted in the EMR.    PHYSICAL EXAMINATION:  GENERAL:  Alert, in no apparent distress.  VITAL SIGNS:  Stable, reviewed and noted.   HEENT:  Head normocephalic and atraumatic.  Conjunctivae pink.  Lids normal.  Sclerae nonicteric, noninjected.  Pupils equally round and reactive to light and accommodation.  Extraocular muscles intact.  Irides unremarkable.  Funduscopic exam and optic discs unremarkable.  Auricles, tympanic membranes, canals and mastoids normal.  Nose externally unremarkable.  Mucosa, septum and nasal turbinates unremarkable.  Hearing grossly normal.  Lips, buccal mucosa, floor of the mouth, tongue and dentition all normal.  Oropharynx unremarkable.  NECK:  Supple and movable without lymphadenopathy, palpable thyroid abnormality, tenderness or carotid bruit.  HEART:  PMI normal.  Regular rate and rhythm.  Normal S1, S2. No S3, S4, murmur or rub.  LUNGS:  Clear to auscultation bilaterally.  No rales, rhonchi or wheezing.  Inspiratory to expiratory phases and chest excursion normal bilaterally.  ABDOMEN:  Soft, nontender, and nondistended with normoactive bowel sounds.  No hepatosplenomegaly or masses appreciated.  Has 2+ femoral pulses.    EXTREMITIES:  No clubbing, cyanosis or edema, 2+ pulses throughout.  NEUROLOGIC:  Nonfocal.    IMPRESSION:  Normal preoperative exam.  The patient is not at elevated risk for cardiac complications of this procedure according to currently published guidelines.  Further plan  per Dr. Gutierres.      Dictated By: Nicolas John MD  Signing Provider: Nicolas John MD    JMARK/doug ([id number])  DD: 10/15/2017 20:04:02 TD: 10/15/2017 20:19:41    Copy Sent To:     cc: Leroy Gutierres DPM   (0) blue, pale

## 2021-09-16 NOTE — OB PROVIDER H&P - NS_ADMITDT_OBGYN_ALL_OB_DT
Patient called stating she needs a refill on lisinopril. She wants it to go to [facility] on [address]. She said this will be a new script. Any questions call to discuss.     Current Outpatient Medications   Medication Sig Dispense Refill   • hydrochlorothiazide (HYDRODIURIL) 12.5 MG tablet Take 1 tablet by mouth daily. 90 tablet 3   • acetaminophen (TYLENOL) 325 MG tablet Take 650 mg by mouth every 4 hours as needed for Pain.     • lisinopril (ZESTRIL) 10 MG tablet Take 1 tablet by mouth daily. 5 tablet 0   • simvastatin (ZOCOR) 10 MG tablet Take 1 tablet by mouth nightly. 5 tablet 0   • fexofenadine (ALLEGRA) 180 MG tablet Take 180 mg by mouth daily.       No current facility-administered medications for this visit.     
Rx sent to [facility].   
16-Sep-2021 07:50

## 2021-09-16 NOTE — OB PROVIDER DELIVERY SUMMARY - NSSELHIDDEN_OBGYN_ALL_OB_FT
[NS_DeliveryAttending1_OBGYN_ALL_OB_FT:[id number]],[NS_DeliveryAssist1_OBGYN_ALL_OB_FT:[id number]]

## 2021-09-16 NOTE — OB NEONATOLOGY/PEDIATRICIAN DELIVERY SUMMARY - NSPEDSNEONOTESB_OBGYN_ALL_OB_FT
Called by Dr. Cr to attend  [facility] delivery due to 33 weeks twin gestation . 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 Pregnancy was complicated by mono-di twin gestation and  labor, received BMZ on 21 and 21 .   ROM at 0900 on 21 , approximately  6 hrs.  Resuscitation included: WDSS, CPAP 6 with Fi02 .25%.  Apgars were: 7&8. Admit to NICU.  Temperature prior to transfer 36.6C.

## 2021-09-16 NOTE — OB RN DELIVERY SUMMARY - NSSELHIDDEN_OBGYN_ALL_OB_FT
[NS_DeliveryAttending1_OBGYN_ALL_OB_FT:[email or web address]],[NS_DeliveryAssist1_OBGYN_ALL_OB_FT:[id number]],[NS_DeliveryRN_OBGYN_ALL_OB_FT:[id number]]

## 2021-09-16 NOTE — OB PROVIDER DELIVERY SUMMARY - NSPROVIDERDELIVERYNOTE_OBGYN_ALL_OB_FT
Pt noted to be fully dilated and transferred to OR2 for delivery. Spontaneous vaginal delivery of liveborn infant A from OA position. Head, shoulders, and body delivered easily. Infant was suctioned. No mec. 30 second delayed cord clamping was performed. Cord clamped and cut and infant passed to peds. Fetal vertex of baby B palpated, AROM performed, clear fluid. Spontaneous vaginal delivery of liveborn infant B from ROP position. Head, shoulders, and body delivered easily. Infant was suctioned. No mec. 30 second delayed cord clamping was performed. Cord clamped and cut and infant passed to peds. Placenta delivered intact with a 3 vessel cord. Fundal massage was given and uterine fundus was found to be firm. Vaginal exam revealed an intact cervix, vaginal walls, and sulci. Perineum intact. Patient was stable and went to recovery. Count was correct x2.

## 2021-09-16 NOTE — CONSULT NOTE ADULT - SUBJECTIVE AND OBJECTIVE BOX
Ms. Gallegos is a 26 y/o  admitted at 33w2d gestational age. Maternal history notable for mono-di tiwns, and prenatal history notable for twin gestation. Most recent EFW unknown.    I met with Ms. Gallegos and her partner and discussed what to expect should she deliver at 33 weeks gestation. We discussed the followin. The NICU team will be present at her delivery and will immediately assess and care for her infant.    2. The infant may require respiratory support, most commonly in the form of nasal CPAP. While unlikely, there is a small possibility that the infant would require intubation and mechanical ventilation.    3. Depending on the clinical status of the infant, enteral feedings may or may not be started immediately. The infant will receive IVF/IV nutrition as necessary. Due to immature suck/swallow, orogastric or nasogastric tube may be required once feeds are initiated. The infant is also at risk for hypoglycemia due to prematurity.    4. Discussed the benefits of breastfeeding in  infants and encouraged mother to pump following delivery.    5. Due to prematurity, the infant is at increased risk for jaundice, which can be treated with phototherapy.    6. The infant will be screened for infection and treated with antibiotics if deemed clinically necessary.    7. The infant is at risk for developmental delays as a consequence of prematurity. The infant will be evaluated by a developmental pediatrician to monitor for neurodevelopmental delays.    8. Thermoregulation issues and need to be in an isolette with slow weaning to a crib discussed.    9. The infant is at risk for developmental delays as a consequence of prematurity. The infant will be evaluated by a developmental pediatrician to monitor for neurodevelopmental delays.    10. Length of stay is highly variable, but given the infant’s weight and gestational age, the family may expect an average stay of about 2 weeks. Reviewed discharge criteria.    Ms. Gallegos and her partner had the opportunity to ask questions and may contact the NICU at any time if further questions arise.    Thank you for the opportunity to participate in the care of this patient and please inform us of any changes in her status.

## 2021-09-16 NOTE — OB NEONATOLOGY/PEDIATRICIAN DELIVERY SUMMARY - NSPEDSNEONOTESA_OBGYN_ALL_OB_FT
Called by Dr. Cr to attend  [facility] delivery due to 33.2 weeks twin gestation . 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 Pregnancy was complicated by mono-di twin gestation and  labor, received BMZ on 21 and 21 .   ROM at 0900 on 21 , approximately  6 hrs.  Resuscitation included: WDSS .  Apgars were: 8&9. Admit to NICU.  Temperature prior to transfer 36.5C.

## 2021-09-16 NOTE — OB RN TRIAGE NOTE - NSICDXPASTMEDICALHX_GEN_ALL_CORE_FT
PAST MEDICAL HISTORY:  No pertinent past medical history PAST MEDICAL HISTORY:  2019 novel coronavirus disease (COVID-19) 7/2021

## 2021-09-16 NOTE — OB RN PATIENT PROFILE - THE IMPORTANCE OF THE CORRECT PLACEMENT OF THE INFANT AND THE PARENT’S ABILITY TO ASSESS THE INFANT'S SKIN COLOR WHILE PLACED ON SKIN TO SKIN HAS BEEN REINFORCED.
INTERVAL HPI:  89 WF with HTN, COPD on O2 & Nocturnal NIV, Diastolic CHF, Severe pHTN, Hypothyroidism ,A fib on Coumadin, Valve replacement. Non smoker. Multiple admissions for COPD exacerbation and decompensated CHF.  Recent admission with acute on chronic hypoxic hypercarbic Respiratory failure, was discharged on 21:  Again brought with SOB, more than normal cough and sensations of not being able to swallow.  in ED was tachypneic and SPO2 of 86% on room air.  Son at bedside, denies new fever, chills, cough or sputum production.    Vital Signs Last 24 Hrs  T(C): 36.1 (28 May 2021 16:14), Max: 36.7 (27 May 2021 18:12)  T(F): 97 (28 May 2021 16:14), Max: 98 (27 May 2021 18:12)  HR: 76 (28 May 2021 16:14) (55 - 93)  BP: 108/58 (28 May 2021 16:14) (108/58 - 151/79)  BP(mean): 75 (28 May 2021 16:14) (75 - 75)  RR: 21 (28 May 2021 16:14) (18 - 29)  SpO2: 96% (28 May 2021 16:14) (95% - 99%)    PHYSICAL EXAM:  GEN:         Awake, responsive and comfortable.  HEENT:    Normal.    RESP:       occasional wheezing.  CVS:          Regular rate and rhythm.   ABD:         Soft, non-tender, non-distended;     MEDICATIONS  (STANDING):  albuterol/ipratropium for Nebulization 3 milliLiter(s) Nebulizer every 6 hours  diltiazem    milliGRAM(s) Oral daily  furosemide   Injectable 40 milliGRAM(s) IV Push every 12 hours  levothyroxine 100 MICROGram(s) Oral daily  pantoprazole    Tablet 40 milliGRAM(s) Oral before breakfast  piperacillin/tazobactam IVPB.. 3.375 Gram(s) IV Intermittent every 8 hours  sildenafil (REVATIO) 20 milliGRAM(s) Oral three times a day  warfarin 4 milliGRAM(s) Oral once    MEDICATIONS  (PRN):  ALPRAZolam 0.25 milliGRAM(s) Oral two times a day PRN anxiety  nystatin Powder 1 Application(s) Topical every 12 hours PRN rash    LABS:                        8.8    9.95  )-----------( 153      ( 28 May 2021 10:15 )             27.6         140  |  103  |  36<H>  ----------------------------<  103<H>  4.1   |  34<H>  |  1.24    Ca    8.9      28 May 2021 10:15  Mg     2.3         TPro  7.3  /  Alb  3.0<L>  /  TBili  0.4  /  DBili  x   /  AST  16  /  ALT  16  /  AlkPhos  66      PT/INR - ( 28 May 2021 10:15 )   PT: 26.1 sec;   INR: 2.34 ratio      PTT - ( 28 May 2021 08:32 )  PTT:34.8 sec   @ 16:31  pH: 7.37  pCO2: 57  pO2: 378  SaO2: 100    Urinalysis Basic - ( 27 May 2021 17:25 )    Color: Yellow / Appearance: Clear / S.010 / pH: x  Gluc: x / Ketone: Negative  / Bili: Negative / Urobili: Negative mg/dL   Blood: x / Protein: 30 mg/dL / Nitrite: Negative   Leuk Esterase: Negative / RBC: x / WBC 0-2   Sq Epi: x / Non Sq Epi: Moderate / Bacteria: Few    ASSESSMENT AND PLAN:  ·	Chronic hypoxic hypercarbic Respiratory failure.  ·	Right pleural effusion.  ·	Chronic diastolic CHF.  ·	Acute COPD exacerbation.  ·	Severe pHTN.  ·	Moderate TR.  ·	Renal  Insuffiencey.  ·	Chronic A Fib.  ·	Hypothyroidism.  ·	Anemia.    Pulmonary status and oxygenation appears close to base line.  After speaking with son it appears that pt becomes anxious/worried at times and start feeling SOB.  Continue O2, nocturnal NIV and nebulizer.  On Sildenafil and diuretics.  Continue Coumadin.       HPI:  89-year-old with COPD on 3 L nasal cannula supplemental oxygen at home, hypertension, heart failure preserved ejection fraction, severe pulmonary hypertension, atrial fibrillation on Coumadin, hypothyroidism, admitted with shortness of breath and cough perhaps COPD and an element of heart failure. Resting more comfortably today.      REVIEW OF SYSTEMS:  General:  No wt loss, fevers, chills, night sweats  Eyes:  Good vision, no reported pain  ENT:  No sore throat, pain, runny nose, dysphagia  CV:  No pain, palpitations, hypo/hypertension  Resp:  No dyspnea, cough, tachypnea, wheezing  GI:  No pain, nausea, vomiting, diarrhea, constipation  :  No pain, bleeding, incontinence, nocturia  Muscle:  No pain, weakness  Breast:  No pain, abscess, mass, discharge  Neuro:  No weakness, tingling, memory problems  Psych:  No fatigue, insomnia, mood problems, depression  Endocrine:  No polyuria, polydipsia, cold/heat intolerance  Heme:  No petechiae, ecchymosis, easy bruisability  Skin:  No rash, edema    PHYSICAL EXAM:  Vital Signs Last 24 Hrs  T(C): 36.6 (30 May 2021 09:52), Max: 36.7 (29 May 2021 16:32)  T(F): 97.9 (30 May 2021 09:52), Max: 98 (29 May 2021 16:32)  HR: 112 (30 May 2021 11:10) (65 - 115)  BP: 112/74 (30 May 2021 09:52) (99/60 - 127/69)  BP(mean): 87 (30 May 2021 09:52) (87 - 87)  RR: 19 (30 May 2021 09:52) (16 - 19)  SpO2: 100% (30 May 2021 11:10) (94% - 100%)      Telemetry: Atrial fibrillation with brief periods of RVR    Constitutional: well developed, normal appearance, well groomed, well nourished, no deformities and no acute distress.   Eyes: the conjunctiva exhibited no abnormalities and the eyelids demonstrated no xanthelasmas.   HEENT: normal oral mucosa, no oral pallor and no oral cyanosis.   Neck: normal jugular venous A waves present, normal jugular venous V waves present and no jugular venous waddell A waves.   Pulmonary: no respiratory distress, normal respiratory rhythm and effort, no accessory muscle use and lungs were clear to auscultation bilaterally.   Cardiovascular: irregularly irregular, normal S1 and S2 and no murmur  Abdomen: soft, non-tender  Musculoskeletal: the gait could not be assessed.  Extremities: no clubbing of the fingernails, no localized cyanosis, no petechial hemorrhages and no ischemic changes.         LABORATORY:                              9.7    8.82  )-----------( 159      ( 29 May 2021 07:43 )             30.2     05-29    142  |  101  |  34<H>  ----------------------------<  84  3.5   |  35<H>  |  1.18    Ca    8.7      29 May 2021 07:43             INR: 3.24        CARDIAC MARKERS ( 27 May 2021 15:16 )  .025 ng/mL / x     / x     / x     / x            IMAGING:  < from: 12 Lead ECG (05.27.21 @ 14:16) >  Atrial fibrillation  Low voltage QRS  Cannot rule out Anteroseptal infarct (cited on or before 18-OCT-2020)  Abnormal ECG    < end of copied text >    < from: CT Chest No Cont (05.28.21 @ 11:08) >  IMPRESSION:  Moderate right pleural effusion, stable.  Small leftpleural effusion, mildly increased.    < end of copied text >    < from: TTE Echo Complete w/o Contrast w/ Doppler (05.14.21 @ 16:17) >  Summary:   1. Technically suboptimal study.   2. Left ventricular ejection fraction, by visual estimation, is 60 to 65%.   3. Normal global left ventricular systolic function.   4. Severely enlarged left atrium.   5. Spectral Doppler shows restrictive pattern of left ventricular myocardial filling (Grade III diastolic dysfunction).   6. Right atrial enlargement.   7. Mild mitral annular calcification.   8. Mild mitral valve regurgitation.   9. Thickening of the anterior and posterior mitral valve leaflets.  10. Estimated pulmonary artery systolic pressure is 63.3 mmHg assuming a right atrial pressure of 10 mmHg, which is consistent with severe pulmonary hypertension.  11. The aortic valve mean gradient is 14.1 mmHg consistent with mild aortic stenosis.    < end of copied text >    ASSESSMENT:  89-year-old with COPD on 3 L nasal cannula supplemental oxygen at home, hypertension, heart failure preserved ejection fraction, severe pulmonary hypertension, atrial fibrillation on Coumadin, hypothyroidism, admitted with shortness of breath and cough perhaps COPD and an element of heart failure. Resting more comfortably today.    PLAN:         albuterol/ipratropium for Nebulization 3 milliLiter(s) Nebulizer every 6 hours  artificial  tears Solution 1 Drop(s) Both EYES four times a day  clonazePAM  Tablet 0.5 milliGRAM(s) Oral two times a day  diltiazem    milliGRAM(s) Oral daily  furosemide   Injectable 40 milliGRAM(s) IV Push every 12 hours  levothyroxine 100 MICROGram(s) Oral daily  pantoprazole    Tablet 40 milliGRAM(s) Oral before breakfast  piperacillin/tazobactam IVPB.. 3.375 Gram(s) IV Intermittent every 8 hours  sildenafil (REVATIO) 20 milliGRAM(s) Oral three times a day    Continue on Lasix 40 mg IV push b.i.d. to maintain negative fluid status and follow labs. Continue telemetry monitoring and will release diltiazem  mg daily along with sildenafil 20 mg TID for heart rate management and pulmonary hypertension treatment. Continue antibiotics, thyroid medication and nebulizers along with supportive care. Stay on warfarin for stroke risk reduction with goal INR of 2-3.    Quentin Bernard MD, FACC, DEWAYNE, JAMES, FACP  Director, Heart Failure Services  [facility]  , Department of Cardiology  [facility]                             Patient: FREDERICK MA [id number] 89y Female                            Hospitalist Attending Note    Feeling better.  Son at bedside.  Other son Candelario available via phone.  No new complaints.  No chest pain / palpitations.   SOB at baseline.     ____________________PHYSICAL EXAM:  GENERAL:  NAD, Alert and Oriented x 3   HEENT: NCAT  CARDIOVASCULAR:  S1, S2  LUNGS: coarse BS b/l, decreased BS R base.   ABDOMEN:  soft, (-) tenderness, (-) distension, (+) bowel sounds, (-) guarding, (-) rebound (-) rigidity  EXTREMITIES:  no cyanosis / clubbing / edema.   ____________________    VITALS:  Vital Signs Last 24 Hrs  T(C): 36.6 (30 May 2021 09:52), Max: 36.7 (29 May 2021 16:32)  T(F): 97.9 (30 May 2021 09:52), Max: 98 (29 May 2021 16:32)  HR: 112 (30 May 2021 11:10) (65 - 115)  BP: 112/74 (30 May 2021 09:52) (99/60 - 127/69)  BP(mean): 87 (30 May 2021 09:52) (87 - 87)  RR: 19 (30 May 2021 09:52) (16 - 19)  SpO2: 100% (30 May 2021 11:10) (94% - 100%) Daily     Daily Weight in k.6 (30 May 2021 04:43)  CAPILLARY BLOOD GLUCOSE        I&O's Summary    29 May 2021 07:01  -  30 May 2021 07:00  --------------------------------------------------------  IN: 0 mL / OUT: 600 mL / NET: -600 mL        LABS:                        9.7    8.82  )-----------( 159      ( 29 May 2021 07:43 )             30.2     05-    142  |  101  |  34<H>  ----------------------------<  84  3.5   |  35<H>  |  1.18    Ca    8.7      29 May 2021 07:43      PT/INR - ( 30 May 2021 08:22 )   PT: 35.5 sec;   INR: 3.24 ratio                     Culture - Urine (collected 28 May 2021 00:55)  Source: .Urine Clean Catch (Midstream)  Final Report (28 May 2021 22:56):    <10,000 CFU/mL Normal Urogenital Bebe        MEDICATIONS:  acetaminophen   Tablet .. 650 milliGRAM(s) Oral every 6 hours PRN  albuterol/ipratropium for Nebulization 3 milliLiter(s) Nebulizer every 6 hours  artificial  tears Solution 1 Drop(s) Both EYES four times a day  clonazePAM  Tablet 0.5 milliGRAM(s) Oral two times a day  diltiazem    milliGRAM(s) Oral daily  furosemide   Injectable 40 milliGRAM(s) IV Push every 12 hours  levothyroxine 100 MICROGram(s) Oral daily  nystatin Powder 1 Application(s) Topical every 12 hours PRN  pantoprazole    Tablet 40 milliGRAM(s) Oral before breakfast  piperacillin/tazobactam IVPB.. 3.375 Gram(s) IV Intermittent every 8 hours  sildenafil (REVATIO) 20 milliGRAM(s) Oral three times a day       INTERVAL HPI:   89 WF with HTN, COPD on O2 & Nocturnal NIV, Diastolic CHF, Severe pHTN, Hypothyroidism ,A fib on Coumadin, Valve replacement. Non smoker. Multiple admissions for COPD exacerbation and decompensated CHF.  Recent admission with acute on chronic hypoxic hypercarbic Respiratory failure, was discharged on 05/19/21:  Again brought with SOB, more than normal cough and sensations of not being able to swallow.  in ED was tachypneic and SPO2 of 86% on room air.  Son at bedside, denies new fever, chills, cough or sputum production.    OVERNIGHT EVENTS:  Awake and comfortable.    Vital Signs Last 24 Hrs  T(C): 36.7 (29 May 2021 16:32), Max: 36.7 (29 May 2021 05:02)  T(F): 98 (29 May 2021 16:32), Max: 98.1 (29 May 2021 05:02)  HR: 69 (29 May 2021 17:10) (68 - 110)  BP: 103/67 (29 May 2021 16:32) (102/54 - 138/58)  BP(mean): 70 (29 May 2021 11:01) (70 - 70)  RR: 18 (29 May 2021 16:32) (18 - 18)  SpO2: 98% (29 May 2021 17:10) (92% - 98%)    PHYSICAL EXAM:  GEN:         Awake, responsive and comfortable.  HEENT:    Normal.    RESP:        no distress  CVS:          Regular rate and rhythm.   ABD:         Soft, non-tender, non-distended;     MEDICATIONS  (STANDING):  albuterol/ipratropium for Nebulization 3 milliLiter(s) Nebulizer every 6 hours  artificial  tears Solution 1 Drop(s) Both EYES four times a day  clonazePAM  Tablet 0.5 milliGRAM(s) Oral two times a day  diltiazem    milliGRAM(s) Oral daily  furosemide   Injectable 40 milliGRAM(s) IV Push every 12 hours  levothyroxine 100 MICROGram(s) Oral daily  pantoprazole    Tablet 40 milliGRAM(s) Oral before breakfast  piperacillin/tazobactam IVPB.. 3.375 Gram(s) IV Intermittent every 8 hours  sildenafil (REVATIO) 20 milliGRAM(s) Oral three times a day  warfarin 3 milliGRAM(s) Oral once    MEDICATIONS  (PRN):  acetaminophen   Tablet .. 650 milliGRAM(s) Oral every 6 hours PRN Mild Pain (1 - 3)  nystatin Powder 1 Application(s) Topical every 12 hours PRN rash    LABS:                        9.7    8.82  )-----------( 159      ( 29 May 2021 07:43 )             30.2     05-29    142  |  101  |  34<H>  ----------------------------<  84  3.5   |  35<H>  |  1.18    Ca    8.7      29 May 2021 07:43    PT/INR - ( 29 May 2021 07:43 )   PT: 29.0 sec;   INR: 2.62 ratio      PTT - ( 28 May 2021 08:32 )  PTT:34.8 sec  05-27 @ 16:31  pH: 7.37  pCO2: 57  pO2: 378  SaO2: 100    ASSESSMENT AND PLAN:  ·	Chronic hypoxic hypercarbic Respiratory failure.  ·	Right pleural effusion.  ·	Chronic diastolic CHF.  ·	Acute COPD exacerbation.  ·	Severe pHTN.  ·	Moderate TR.  ·	Renal  Insuffiencey.  ·	Chronic A Fib.  ·	Hypothyroidism.  ·	Anemia.    Pulmonary status and oxygenation are close to base line.  After speaking with son yesterday at bed side,  it appears that pt becomes anxious/worried at times and start feeling SOB.  Continue O2, nocturnal NIV and nebulizer.  On Sildenafil and diuretics.  Continue Coumadin.  Discharge planning.     INTERVAL HPI:  89 WF with HTN, COPD on O2 & Nocturnal NIV, Diastolic CHF, Severe pHTN, Hypothyroidism ,A fib on Coumadin, Valve replacement. Non smoker. Multiple admissions for COPD exacerbation and decompensated CHF.  Recent admission with acute on chronic hypoxic hypercarbic Respiratory failure, was discharged on 05/19/21:  Again brought with SOB, more than normal cough and sensations of not being able to swallow.  in ED was tachypneic and SPO2 of 86% on room air.  Son at bedside, denies new fever, chills, cough or sputum production.    OVERNIGHT EVENTS:  Comfortable in bed    Vital Signs Last 24 Hrs  T(C): 36.4 (30 May 2021 16:31), Max: 36.7 (29 May 2021 23:32)  T(F): 97.6 (30 May 2021 16:31), Max: 98 (29 May 2021 23:32)  HR: 102 (30 May 2021 16:31) (65 - 115)  BP: 108/74 (30 May 2021 16:31) (99/60 - 127/69)  BP(mean): 87 (30 May 2021 09:52) (87 - 87)  RR: 19 (30 May 2021 16:31) (16 - 19)  SpO2: 99% (30 May 2021 16:31) (96% - 100%)    PHYSICAL EXAM:  GEN:         Awake, responsive and comfortable.  HEENT:    Normal.    RESP:        no wheezing.  CVS:           Regular rate and rhythm.   ABD:         Soft, non-tender, non-distended;     MEDICATIONS  (STANDING):  albuterol/ipratropium for Nebulization 3 milliLiter(s) Nebulizer every 6 hours  artificial  tears Solution 1 Drop(s) Both EYES four times a day  clonazePAM  Tablet 0.5 milliGRAM(s) Oral two times a day  diltiazem    milliGRAM(s) Oral daily  furosemide   Injectable 40 milliGRAM(s) IV Push every 12 hours  levothyroxine 100 MICROGram(s) Oral daily  pantoprazole    Tablet 40 milliGRAM(s) Oral before breakfast  piperacillin/tazobactam IVPB.. 3.375 Gram(s) IV Intermittent every 8 hours  sildenafil (REVATIO) 20 milliGRAM(s) Oral three times a day    MEDICATIONS  (PRN):  acetaminophen   Tablet .. 650 milliGRAM(s) Oral every 6 hours PRN Mild Pain (1 - 3)  nystatin Powder 1 Application(s) Topical every 12 hours PRN rash    LABS:                        9.7    8.82  )-----------( 159      ( 29 May 2021 07:43 )             30.2     05-29    142  |  101  |  34<H>  ----------------------------<  84  3.5   |  35<H>  |  1.18    Ca    8.7      29 May 2021 07:43    PT/INR - ( 30 May 2021 08:22 )   PT: 35.5 sec;   INR: 3.24 ratio      05-27 @ 16:31  pH: 7.37  pCO2: 57  pO2: 378  SaO2: 100    ASSESSMENT AND PLAN:  ·	Chronic hypoxic hypercarbic Respiratory failure.  ·	Right pleural effusion.  ·	Chronic diastolic CHF.  ·	Acute COPD exacerbation.  ·	Severe pHTN.  ·	Moderate TR.  ·	Renal  Insuffiencey.  ·	Chronic A Fib.  ·	Hypothyroidism.  ·	Anemia.    Pulmonary status and oxygenation are close to base line.  After speaking with son on day of admission  it appears that pt becomes anxious/worried at times and start feeling SOB.  Continue O2, nocturnal NIV and nebulizer.  On Sildenafil and diuretics.  Continue Coumadin.  Discharge planning.                             Patient: FREDERICK MA [id number] 89y Female                            Hospitalist Attending Note    On NC at 3-4L / min.  She continues to report subjective SOB, SaO2 at bedside was 100%, HR 80s.  No chest pain / palpitations.     ____________________PHYSICAL EXAM:  GENERAL:  Slightly Anxious, Alert and Oriented x 3   HEENT: NCAT  CARDIOVASCULAR:  S1, S2  LUNGS: coarse BS b/l, decreased BS R base.   ABDOMEN:  soft, (-) tenderness, (-) distension, (+) bowel sounds, (-) guarding, (-) rebound (-) rigidity  EXTREMITIES:  no cyanosis / clubbing / edema.   ____________________    VITALS:  Vital Signs Last 24 Hrs  T(C): 36.1 (29 May 2021 11:01), Max: 36.7 (29 May 2021 05:02)  T(F): 97 (29 May 2021 11:01), Max: 98.1 (29 May 2021 05:02)  HR: 68 (29 May 2021 11:20) (68 - 110)  BP: 102/54 (29 May 2021 11:01) (102/54 - 138/58)  BP(mean): 70 (29 May 2021 11:01) (70 - 75)  RR: 18 (29 May 2021 11:01) (18 - 21)  SpO2: 98% (29 May 2021 11:20) (92% - 100%) Daily     Daily Weight in k.1 (29 May 2021 05:02)  CAPILLARY BLOOD GLUCOSE        I&O's Summary    28 May 2021 07:01  -  29 May 2021 07:00  --------------------------------------------------------  IN: 180 mL / OUT: 800 mL / NET: -620 mL        LABS:                        9.7    8.82  )-----------( 159      ( 29 May 2021 07:43 )             30.2     05    142  |  101  |  34<H>  ----------------------------<  84  3.5   |  35<H>  |  1.18    Ca    8.7      29 May 2021 07:43  Mg     2.3     -    TPro  7.3  /  Alb  3.0<L>  /  TBili  0.4  /  DBili  x   /  AST  16  /  ALT  16  /  AlkPhos  66  05-    PT/INR - ( 29 May 2021 07:43 )   PT: 29.0 sec;   INR: 2.62 ratio         PTT - ( 28 May 2021 08:32 )  PTT:34.8 sec  LIVER FUNCTIONS - ( 27 May 2021 15:16 )  Alb: 3.0 g/dL / Pro: 7.3 gm/dL / ALK PHOS: 66 U/L / ALT: 16 U/L / AST: 16 U/L / GGT: x           Urinalysis Basic - ( 27 May 2021 17:25 )    Color: Yellow / Appearance: Clear / S.010 / pH: x  Gluc: x / Ketone: Negative  / Bili: Negative / Urobili: Negative mg/dL   Blood: x / Protein: 30 mg/dL / Nitrite: Negative   Leuk Esterase: Negative / RBC: x / WBC 0-2   Sq Epi: x / Non Sq Epi: Moderate / Bacteria: Few      CARDIAC MARKERS ( 27 May 2021 15:16 )  .025 ng/mL / x     / x     / x     / x            Culture - Urine (collected 28 May 2021 00:55)  Source: .Urine Clean Catch (Midstream)  Final Report (28 May 2021 22:56):    <10,000 CFU/mL Normal Urogenital Bebe        MEDICATIONS:  albuterol/ipratropium for Nebulization 3 milliLiter(s) Nebulizer every 6 hours  clonazePAM  Tablet 0.5 milliGRAM(s) Oral two times a day  diltiazem    milliGRAM(s) Oral daily  furosemide   Injectable 40 milliGRAM(s) IV Push every 12 hours  levothyroxine 100 MICROGram(s) Oral daily  nystatin Powder 1 Application(s) Topical every 12 hours PRN  pantoprazole    Tablet 40 milliGRAM(s) Oral before breakfast  piperacillin/tazobactam IVPB.. 3.375 Gram(s) IV Intermittent every 8 hours  sildenafil (REVATIO) 20 milliGRAM(s) Oral three times a day                               Patient: FREDERICK MA [id number] 89y Female                            Hospitalist Attending Note    No complaints.  No SOB / cough / chest pain / palpitations.  Daughter states she is better than baseline at this point.   ____________________PHYSICAL EXAM:  GENERAL:  NAD, Alert and Oriented x 3   HEENT: NCAT  CARDIOVASCULAR:  S1, S2  LUNGS: coarse BS b/l, decreased BS R base.   ABDOMEN:  soft, (-) tenderness, (-) distension, (+) bowel sounds, (-) guarding, (-) rebound (-) rigidity  EXTREMITIES:  no cyanosis / clubbing / edema.   ____________________    VITALS:  Vital Signs Last 24 Hrs  T(C): 36.4 (31 May 2021 09:00), Max: 36.8 (30 May 2021 23:33)  T(F): 97.6 (31 May 2021 09:00), Max: 98.3 (30 May 2021 23:33)  HR: 90 (31 May 2021 12:05) (63 - 112)  BP: 126/84 (31 May 2021 09:00) (108/74 - 132/69)  BP(mean): --  RR: 16 (31 May 2021 09:00) (16 - 19)  SpO2: 99% (31 May 2021 12:05) (95% - 100%) Daily     Daily Weight in k.2 (31 May 2021 06:29)  CAPILLARY BLOOD GLUCOSE        I&O's Summary    30 May 2021 07:01  -  31 May 2021 07:00  --------------------------------------------------------  IN: 100 mL / OUT: 1160 mL / NET: -1060 mL        LABS:                        9.8    7.26  )-----------( 183      ( 31 May 2021 08:06 )             30.9     05-31    141  |  99  |  43<H>  ----------------------------<  74  3.4<L>   |  33<H>  |  1.35<H>    Ca    8.6      31 May 2021 08:06      PT/INR - ( 31 May 2021 08:06 )   PT: 32.1 sec;   INR: 2.91 ratio                       MEDICATIONS:  acetaminophen   Tablet .. 650 milliGRAM(s) Oral every 6 hours PRN  albuterol/ipratropium for Nebulization 3 milliLiter(s) Nebulizer every 6 hours  artificial  tears Solution 1 Drop(s) Both EYES four times a day  clonazePAM  Tablet 0.5 milliGRAM(s) Oral two times a day  diltiazem    milliGRAM(s) Oral daily  furosemide    Tablet 40 milliGRAM(s) Oral daily  levothyroxine 100 MICROGram(s) Oral daily  nystatin Powder 1 Application(s) Topical every 12 hours PRN  pantoprazole    Tablet 40 milliGRAM(s) Oral before breakfast  sildenafil (REVATIO) 20 milliGRAM(s) Oral three times a day  warfarin 3 milliGRAM(s) Oral once                               Patient: FREDERICK MA [id number] 89y Female                            Hospitalist Attending Note    Remains on 50% VM.  Pt restless.  Seen with [place] .  No chest pain / palpitations.     ____________________PHYSICAL EXAM:  GENERAL:  Anxious, Alert and Oriented x 3   HEENT: NCAT  CARDIOVASCULAR:  S1, S2  LUNGS: coarse BS b/l, decreased BS R base.   ABDOMEN:  soft, (-) tenderness, (-) distension, (+) bowel sounds, (-) guarding, (-) rebound (-) rigidity  EXTREMITIES:  no cyanosis / clubbing / edema.   ____________________    VITALS:  Vital Signs Last 24 Hrs  T(C): 36.5 (28 May 2021 11:17), Max: 37.2 (27 May 2021 14:32)  T(F): 97.7 (28 May 2021 11:17), Max: 98.9 (27 May 2021 14:32)  HR: 73 (28 May 2021 11:34) (54 - 93)  BP: 124/51 (28 May 2021 11:17) (97/28 - 151/79)  BP(mean): --  RR: 18 (28 May 2021 11:17) (18 - 30)  SpO2: 96% (28 May 2021 11:34) (86% - 100%) Daily Height in cm: 152.4 (27 May 2021 14:04)    Daily   CAPILLARY BLOOD GLUCOSE        I&O's Summary    28 May 2021 07:01  -  28 May 2021 13:16  --------------------------------------------------------  IN: 180 mL / OUT: 0 mL / NET: 180 mL        LABS:                        8.8    9.95  )-----------( 153      ( 28 May 2021 10:15 )             27.6     05-28    140  |  103  |  36<H>  ----------------------------<  103<H>  4.1   |  34<H>  |  1.24    Ca    8.9      28 May 2021 10:15  Mg     2.3         TPro  7.3  /  Alb  3.0<L>  /  TBili  0.4  /  DBili  x   /  AST  16  /  ALT  16  /  AlkPhos  66  -    PT/INR - ( 28 May 2021 10:15 )   PT: 26.1 sec;   INR: 2.34 ratio         PTT - ( 28 May 2021 08:32 )  PTT:34.8 sec  LIVER FUNCTIONS - ( 27 May 2021 15:16 )  Alb: 3.0 g/dL / Pro: 7.3 gm/dL / ALK PHOS: 66 U/L / ALT: 16 U/L / AST: 16 U/L / GGT: x           Urinalysis Basic - ( 27 May 2021 17:25 )    Color: Yellow / Appearance: Clear / S.010 / pH: x  Gluc: x / Ketone: Negative  / Bili: Negative / Urobili: Negative mg/dL   Blood: x / Protein: 30 mg/dL / Nitrite: Negative   Leuk Esterase: Negative / RBC: x / WBC 0-2   Sq Epi: x / Non Sq Epi: Moderate / Bacteria: Few      CARDIAC MARKERS ( 27 May 2021 15:16 )  .025 ng/mL / x     / x     / x     / x              MEDICATIONS:  albuterol/ipratropium for Nebulization 3 milliLiter(s) Nebulizer every 6 hours  ALPRAZolam 0.25 milliGRAM(s) Oral two times a day PRN  diltiazem    milliGRAM(s) Oral daily  levothyroxine 100 MICROGram(s) Oral daily  pantoprazole    Tablet 40 milliGRAM(s) Oral before breakfast  piperacillin/tazobactam IVPB.. 3.375 Gram(s) IV Intermittent every 8 hours  sildenafil (REVATIO) 20 milliGRAM(s) Oral three times a day  warfarin 4 milliGRAM(s) Oral once     Statement Selected

## 2021-09-17 ENCOUNTER — TRANSCRIPTION ENCOUNTER (OUTPATIENT)
Age: 26
End: 2021-09-17

## 2021-09-17 ENCOUNTER — APPOINTMENT (OUTPATIENT)
Dept: ANTEPARTUM | Facility: CLINIC | Age: 26
End: 2021-09-17

## 2021-09-17 DIAGNOSIS — R79.89 OTHER SPECIFIED ABNORMAL FINDINGS OF BLOOD CHEMISTRY: ICD-10-CM

## 2021-09-17 DIAGNOSIS — R10.9 UNSPECIFIED ABDOMINAL PAIN: ICD-10-CM

## 2021-09-17 DIAGNOSIS — O21.9 VOMITING OF PREGNANCY, UNSPECIFIED: ICD-10-CM

## 2021-09-17 DIAGNOSIS — O30.033 TWIN PREGNANCY, MONOCHORIONIC/DIAMNIOTIC, THIRD TRIMESTER: ICD-10-CM

## 2021-09-17 DIAGNOSIS — Z3A.32 32 WEEKS GESTATION OF PREGNANCY: ICD-10-CM

## 2021-09-17 DIAGNOSIS — O60.03 PRETERM LABOR WITHOUT DELIVERY, THIRD TRIMESTER: ICD-10-CM

## 2021-09-17 DIAGNOSIS — O99.891 OTHER SPECIFIED DISEASES AND CONDITIONS COMPLICATING PREGNANCY: ICD-10-CM

## 2021-09-17 LAB
HCT VFR BLD CALC: 24.8 % — LOW (ref 34.5–45)
HGB BLD-MCNC: 8.4 G/DL — LOW (ref 11.5–15.5)
MCHC RBC-ENTMCNC: 30.4 PG — SIGNIFICANT CHANGE UP (ref 27–34)
MCHC RBC-ENTMCNC: 33.9 GM/DL — SIGNIFICANT CHANGE UP (ref 32–36)
MCV RBC AUTO: 89.9 FL — SIGNIFICANT CHANGE UP (ref 80–100)
NRBC # BLD: 0 /100 WBCS — SIGNIFICANT CHANGE UP
NRBC # FLD: 0 K/UL — SIGNIFICANT CHANGE UP
PLATELET # BLD AUTO: 239 K/UL — SIGNIFICANT CHANGE UP (ref 150–400)
RBC # BLD: 2.76 M/UL — LOW (ref 3.8–5.2)
RBC # FLD: 14 % — SIGNIFICANT CHANGE UP (ref 10.3–14.5)
WBC # BLD: 17.27 K/UL — HIGH (ref 3.8–10.5)
WBC # FLD AUTO: 17.27 K/UL — HIGH (ref 3.8–10.5)

## 2021-09-17 PROCEDURE — 59409 OBSTETRICAL CARE: CPT | Mod: U7,UB,GC

## 2021-09-17 RX ORDER — ASCORBIC ACID 60 MG
500 TABLET,CHEWABLE ORAL DAILY
Refills: 0 | Status: DISCONTINUED | OUTPATIENT
Start: 2021-09-17 | End: 2021-09-18

## 2021-09-17 RX ORDER — FERROUS SULFATE 325(65) MG
325 TABLET ORAL THREE TIMES A DAY
Refills: 0 | Status: DISCONTINUED | OUTPATIENT
Start: 2021-09-17 | End: 2021-09-18

## 2021-09-17 RX ORDER — SENNA PLUS 8.6 MG/1
2 TABLET ORAL AT BEDTIME
Refills: 0 | Status: DISCONTINUED | OUTPATIENT
Start: 2021-09-17 | End: 2021-09-18

## 2021-09-17 RX ADMIN — Medication 975 MILLIGRAM(S): at 00:00

## 2021-09-17 RX ADMIN — Medication 600 MILLIGRAM(S): at 13:46

## 2021-09-17 RX ADMIN — Medication 600 MILLIGRAM(S): at 14:15

## 2021-09-17 RX ADMIN — Medication 1 TABLET(S): at 13:46

## 2021-09-17 RX ADMIN — Medication 975 MILLIGRAM(S): at 19:08

## 2021-09-17 NOTE — DISCHARGE NOTE OB - MEDICATION SUMMARY - MEDICATIONS TO TAKE
I will START or STAY ON the medications listed below when I get home from the hospital:    acetaminophen 325 mg oral tablet  -- 3 tab(s) by mouth   -- Indication: For pain    ibuprofen 600 mg oral tablet  -- 1 tab(s) by mouth every 6 hours  -- Indication: For pain    Prenatal Multivitamins with Folic Acid 1 mg oral tablet  -- 1 tab(s) by mouth once a day  -- Indication: For post-partum recovery

## 2021-09-17 NOTE — DISCHARGE NOTE OB - PROVIDER TOKENS
FREE:[LAST:[[facility]],PHONE:[phone]],FAX:[(   )    -],ADDRESS:[[place] [address]]]

## 2021-09-17 NOTE — DISCHARGE NOTE OB - CRACKED, BLEEDING NIPPLES
Spoke with Jossy, with [facility] scheduling, to get date for pts next procrit injection.  Jossy stated all of the pt's appointments with infusion were cancelled, nurse advised Dr. Shay cleared pt for procrit, and clearance was faxed, orders in [place].  Nurse phoned pt's daughter to advise she will be contacted by [facility] to schedule pt's Procrit.  Future appts given to daughter, and advised her to call if procrit not given before next scheduled appt.  Full understanding noted.      Statement Selected

## 2021-09-17 NOTE — DISCHARGE NOTE OB - HOSPITAL COURSE
26yo  presented 33w2d gestation with mono-di TIUP,who was found to be 4cm, transferred from [facility] for further care.  Patient had uncomplicated, nonsurgical vaginal delivery.  Please see delivery note for details.  During postpartum course patient's vitals were stable, vaginal bleeding appropriate, and pain well controlled.  On day of discharge patient was ambulating, her pain controlled with oral medications, had adequate oral intake, and was voiding freely.  Discharge instructions and precautions were given.  Will return to clinic in 6 weeks for postpartum visit.  Postpartum birth control plan is Depo-provera.

## 2021-09-17 NOTE — DISCHARGE NOTE OB - CARE PLAN
Principal Discharge DX:	 (normal spontaneous vaginal delivery)  Assessment and plan of treatment:	post-partum recovery   1

## 2021-09-17 NOTE — DISCHARGE NOTE OB - OTHER SIGNIFICANT FINDINGS
24yo  presented 33w2d gestation with mono-di TIUP,who was found to be 4cm, transferred from [facility] for further care.  Patient had uncomplicated, nonsurgical vaginal delivery.  Please see delivery note for details.  During postpartum course patient's vitals were stable, vaginal bleeding appropriate, and pain well controlled.  On day of discharge patient was ambulating, her pain controlled with oral medications, had adequate oral intake, and was voiding freely.  Discharge instructions and precautions were given.  Will return to clinic in 6 weeks for postpartum visit.  Postpartum birth control plan is condoms.

## 2021-09-17 NOTE — PROGRESS NOTE ADULT - SUBJECTIVE AND OBJECTIVE BOX
OB Postpartum Progress Note: PPD #1     26yo now PPD #1 after  of mono-di twin at 33w2d uncomplicated delivery with EBL of 300mL. Pt seen and examined at bedside, no acute overnight events. Patient denies current complaints, her pain is well controlled. States she is ambulating, voiding spontaneously, passing gas, and tolerating regular diet. Denies CP, SOB, N/V, HA, blurred vision, epigastric pain.    Vital Signs Last 24 Hours  T(C): 36.7 (21 @ 06:04), Max: 37.3 (21 @ 17:00)  HR: 91 (21 @ 06:04) (69 - 116)  BP: 106/62 (21 @ 06:04) (101/72 - 120/56)  RR: 19 (21 @ 06:04) (16 - 19)  SpO2: 99% (21 @ 06:04) (90% - 100%)    Physical Exam:  General: NAD, resting comfortably in bed   Abdomen: Soft, non-tender, non-distended, fundus firm  Extremities: Full ROM, moving all extremities spontaneously  Pelvic: Lochia wnl    Labs:    Blood Type: O Positive  Antibody Screen: Negative  RPR: Negative               11.0   9.77  )-----------( 283      (  @ 08:35 )             31.7                9.7    9.87  )-----------( 242      ( 09-15 @ 05:38 )             29.7                10.5   13.87 )-----------( 259      ( 14 @ 10:40 )             30.6         MEDICATIONS  (STANDING):  acetaminophen   Tablet .. 975 milliGRAM(s) Oral <User Schedule>  diphtheria/tetanus/pertussis (acellular) Vaccine (ADAcel) 0.5 milliLiter(s) IntraMuscular once  ibuprofen  Tablet. 600 milliGRAM(s) Oral every 6 hours  influenza   Vaccine 0.5 milliLiter(s) IntraMuscular once  oxytocin Infusion 333.333 milliUNIT(s)/Min (1000 mL/Hr) IV Continuous <Continuous>  prenatal multivitamin 1 Tablet(s) Oral daily  senna 2 Tablet(s) Oral at bedtime  sodium chloride 0.9% lock flush 3 milliLiter(s) IV Push every 8 hours    MEDICATIONS  (PRN):  benzocaine 20%/menthol 0.5% Spray 1 Spray(s) Topical every 6 hours PRN for Perineal discomfort  dibucaine 1% Ointment 1 Application(s) Topical every 6 hours PRN Perineal discomfort  diphenhydrAMINE 25 milliGRAM(s) Oral every 6 hours PRN Pruritus  hydrocortisone 1% Cream 1 Application(s) Topical every 6 hours PRN Moderate Pain (4-6)  lanolin Ointment 1 Application(s) Topical every 6 hours PRN nipple soreness  magnesium hydroxide Suspension 30 milliLiter(s) Oral two times a day PRN Constipation  oxyCODONE    IR 5 milliGRAM(s) Oral every 3 hours PRN Moderate to Severe Pain (4-10)  oxyCODONE    IR 5 milliGRAM(s) Oral once PRN Moderate to Severe Pain (4-10)  pramoxine 1%/zinc 5% Cream 1 Application(s) Topical every 4 hours PRN Moderate Pain (4-6)  simethicone 80 milliGRAM(s) Chew every 4 hours PRN Gas  witch hazel Pads 1 Application(s) Topical every 4 hours PRN Perineal discomfort

## 2021-09-17 NOTE — PROGRESS NOTE ADULT - ATTENDING COMMENTS
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  she was transferred yesterday from [place] at 33 2/7 weeks gestation with PTL of mono di twins.  She was admitted -9/15  to [facility] for BMZ.  she was admitted by Dr Foreman and delivered by Dr. CAMACHO Progress Note:  PPD#1    S: 24yo  PPD#1 s/p . Patient  without complaints just concerns for the babies    O:  Vitals:  Vital Signs Last 24 Hrs  T(C): 36.7 (17 Sep 2021 06:04), Max: 37.3 (16 Sep 2021 17:00)  T(F): 98 (17 Sep 2021 06:04), Max: 99.1 (16 Sep 2021 17:00)  HR: 91 (17 Sep 2021 06:04) (75 - 100)  BP: 106/62 (17 Sep 2021 06:04) (101/72 - 115/65)  BP(mean): --  RR: 19 (17 Sep 2021 06:04) (18 - 19)  SpO2: 99% (17 Sep 2021 06:04) (90% - 100%)    MEDICATIONS  (STANDING):  acetaminophen   Tablet .. 975 milliGRAM(s) Oral <User Schedule>  diphtheria/tetanus/pertussis (acellular) Vaccine (ADAcel) 0.5 milliLiter(s) IntraMuscular once  ibuprofen  Tablet. 600 milliGRAM(s) Oral every 6 hours  influenza   Vaccine 0.5 milliLiter(s) IntraMuscular once  oxytocin Infusion 333.333 milliUNIT(s)/Min (1000 mL/Hr) IV Continuous <Continuous>  prenatal multivitamin 1 Tablet(s) Oral daily  senna 2 Tablet(s) Oral at bedtime  sodium chloride 0.9% lock flush 3 milliLiter(s) IV Push every 8 hours      Labs:  Blood type: O Positive  Rubella IgG: RPR: Negative                          11.0<L>   9.77 >-----------< 283    (  @ 08:35 )             31.7<L>                        9.7<L>   9.87 >-----------< 242    ( 09-15 @ 05:38 )             29.7<L>    09-15- @ 05:38      138  |  104  |  3<L>  ----------------------------<  118<H>  4.1   |  24  |  0.84        Ca    8.7      15 Sep 2021 05:38    TPro  6.5  /  Alb  2.9<L>  /  TBili  0.7  /  DBili  x   /  AST  59<H>  /  ALT  45  /  AlkPhos  204<H>  09-15-21 @ 05:38          Physical Exam:  General: NAD  Abdomen: soft, non-tender, non-distended, fundus firm  Vaginal: Lochia wnl  Extremities: No erythema/edema    A/P: 24yo PPD#1 s/p  on mono di twin who presented in PTL at 33 2/7 weeks gestastion    - Pain well controlled, continue current pain regimen  - Increase ambulation, SCDs when not ambulating  - Continue regular diet    Ratna Chavarria M.D., M.B.A., M.S. 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  she was transferred yesterday from [place] at 33 2/7 weeks gestation with PTL of mono di twins.  She was admitted -9/15  to [facility] for BMZ.  she was admitted by Dr Foreman and delivered by Dr. Foreman    OB Progress Note:  PPD#1    S: 24yo  PPD#1 s/p . Patient  without complaints just concerns for the babies    O:  Vitals:  Vital Signs Last 24 Hrs  T(C): 36.7 (17 Sep 2021 06:04), Max: 37.3 (16 Sep 2021 17:00)  T(F): 98 (17 Sep 2021 06:04), Max: 99.1 (16 Sep 2021 17:00)  HR: 91 (17 Sep 2021 06:04) (75 - 100)  BP: 106/62 (17 Sep 2021 06:04) (101/72 - 115/65)  BP(mean): --  RR: 19 (17 Sep 2021 06:04) (18 - 19)  SpO2: 99% (17 Sep 2021 06:04) (90% - 100%)    MEDICATIONS  (STANDING):  acetaminophen   Tablet .. 975 milliGRAM(s) Oral <User Schedule>  diphtheria/tetanus/pertussis (acellular) Vaccine (ADAcel) 0.5 milliLiter(s) IntraMuscular once  ibuprofen  Tablet. 600 milliGRAM(s) Oral every 6 hours  influenza   Vaccine 0.5 milliLiter(s) IntraMuscular once  oxytocin Infusion 333.333 milliUNIT(s)/Min (1000 mL/Hr) IV Continuous <Continuous>  prenatal multivitamin 1 Tablet(s) Oral daily  senna 2 Tablet(s) Oral at bedtime  sodium chloride 0.9% lock flush 3 milliLiter(s) IV Push every 8 hours      Labs:  Blood type: O Positive  Rubella IgG: RPR: Negative                          11.0<L>   9.77 >-----------< 283    (  @ 08:35 )             31.7<L>                        9.7<L>   9.87 >-----------< 242    ( 09-15 @ 05:38 )             29.7<L>    09-15- @ 05:38      138  |  104  |  3<L>  ----------------------------<  118<H>  4.1   |  24  |  0.84        Ca    8.7      15 Sep 2021 05:38    TPro  6.5  /  Alb  2.9<L>  /  TBili  0.7  /  DBili  x   /  AST  59<H>  /  ALT  45  /  AlkPhos  204<H>  09-15-21 @ 05:38          Physical Exam:  General: NAD  Abdomen: soft, non-tender, non-distended, fundus firm  Vaginal: Lochia wnl  Extremities: No erythema/edema    A/P: 24yo PPD#1 s/p  on mono di twin who presented in PTL at 33 2/7 weeks gestastion    - Pain well controlled, continue current pain regimen  - Increase ambulation, SCDs when not ambulating  - Continue regular diet    Ratna Chavarria M.D., M.B.A., M.S.

## 2021-09-17 NOTE — DISCHARGE NOTE OB - COMMUNITY RESOURCE NAME:
Patient instructed to make a follow up appointment at [facility], [phone] for 4-6 weeks from delivery date

## 2021-09-17 NOTE — DISCHARGE NOTE OB - CARE PROVIDER_API CALL
[facility],   Oncology Building, Basement  [address] [address]  Phone: [phone]  Fax: (   )    -  Follow Up Time:

## 2021-09-17 NOTE — PROVIDER CONTACT NOTE (OTHER) - ACTION/TREATMENT ORDERED:
Patient is drinking water and apple juice Patient is drinking water and apple juice water is running shaila bottle used on perineum no discomfort noted

## 2021-09-18 VITALS
HEART RATE: 98 BPM | RESPIRATION RATE: 18 BRPM | OXYGEN SATURATION: 97 % | TEMPERATURE: 99 F | DIASTOLIC BLOOD PRESSURE: 68 MMHG | SYSTOLIC BLOOD PRESSURE: 116 MMHG

## 2021-09-18 RX ORDER — IBUPROFEN 200 MG
1 TABLET ORAL
Qty: 0 | Refills: 0 | DISCHARGE
Start: 2021-09-18

## 2021-09-18 RX ORDER — ACETAMINOPHEN 500 MG
3 TABLET ORAL
Qty: 0 | Refills: 0 | DISCHARGE
Start: 2021-09-18

## 2021-09-18 RX ORDER — MEDROXYPROGESTERONE ACETATE 150 MG/ML
150 INJECTION, SUSPENSION, EXTENDED RELEASE INTRAMUSCULAR ONCE
Refills: 0 | Status: COMPLETED | OUTPATIENT
Start: 2021-09-18 | End: 2021-09-18

## 2021-09-18 RX ADMIN — Medication 325 MILLIGRAM(S): at 17:13

## 2021-09-18 RX ADMIN — Medication 500 MILLIGRAM(S): at 17:12

## 2021-09-18 RX ADMIN — Medication 600 MILLIGRAM(S): at 17:12

## 2021-09-18 RX ADMIN — Medication 975 MILLIGRAM(S): at 05:00

## 2021-09-18 RX ADMIN — MAGNESIUM HYDROXIDE 30 MILLILITER(S): 400 TABLET, CHEWABLE ORAL at 09:14

## 2021-09-18 RX ADMIN — Medication 1 TABLET(S): at 17:12

## 2021-09-18 RX ADMIN — MEDROXYPROGESTERONE ACETATE 150 MILLIGRAM(S): 150 INJECTION, SUSPENSION, EXTENDED RELEASE INTRAMUSCULAR at 15:17

## 2021-09-18 RX ADMIN — Medication 975 MILLIGRAM(S): at 04:42

## 2021-09-18 NOTE — PROGRESS NOTE ADULT - ATTENDING COMMENTS
I saw and evaluated Ms. Gallegos. Agree with assessment and plan as above.   She has met all postpartum milestones.   Had her first COVID 19 vaccine, due for second on September 29th.   Desires Depo Provera for contraception, ordered.    will have vasectomy.     Postpartum hemorrhage and preeclampsia precautions were reviewed.   Stable for discharge to home.   Follow up postpartum in [facility].     All questions answered.   Brenda Harris MD

## 2021-09-18 NOTE — PROGRESS NOTE ADULT - ASSESSMENT
A/P: 24yo 24yo now PPD #2 after  of mono-di twin at 33w2d uncomplicated delivery with EBL of 300mL. Patient is stable and doing well post-partum.   - Pain well controlled, continue current pain regimen. Standing Ibuprofen, Acetaminophen. Oxycodone available PRN for breakthrough pain.  - Increase ambulation, SCDs when not ambulating  - Continue regular diet  - Discharge planning.    Amyeo Afroz Jereen, PGY-1  
      Assessment:   24yo now PPD #1 after  of mono-di twin at 33w2d EBL of 300mL., recovering well.     Plan:   - Continue scheduled Ibuprofen and Acetaminophen for pain, Oxycodone available PRN for breakthrough pain.  - Increase ambulation, SCDs when not ambulating  - Continue regular diet    Amyeo Jereen, PGY-1

## 2021-09-18 NOTE — PROGRESS NOTE ADULT - SUBJECTIVE AND OBJECTIVE BOX
OB Progress Note:  PPD #2    S: 26yo now PPD #2 after  of mono-di twin at 33w2d uncomplicated delivery with EBL of 300mL. Pt seen and examined at bedside, no acute overnight events. Patient denies current complaints, her pain is well controlled. States she is ambulating, voiding spontaneously, passing gas, and tolerating regular diet. Denies CP, SOB, N/V, HA, blurred vision, epigastric pain.    O:  Vitals:  Vital Signs Last 24 Hrs  T(C): 36.9 (17 Sep 2021 18:53), Max: 36.9 (17 Sep 2021 18:53)  T(F): 98.5 (17 Sep 2021 18:53), Max: 98.5 (17 Sep 2021 18:53)  HR: 98 (17 Sep 2021 18:53) (98 - 98)  BP: 115/66 (17 Sep 2021 18:53) (115/66 - 115/66)  BP(mean): --  RR: 17 (17 Sep 2021 18:53) (17 - 17)  SpO2: 97% (17 Sep 2021 18:53) (97% - 97%)    MEDICATIONS  (STANDING):  acetaminophen   Tablet .. 975 milliGRAM(s) Oral <User Schedule>  ascorbic acid 500 milliGRAM(s) Oral daily  diphtheria/tetanus/pertussis (acellular) Vaccine (ADAcel) 0.5 milliLiter(s) IntraMuscular once  ferrous    sulfate 325 milliGRAM(s) Oral three times a day  ibuprofen  Tablet. 600 milliGRAM(s) Oral every 6 hours  influenza   Vaccine 0.5 milliLiter(s) IntraMuscular once  prenatal multivitamin 1 Tablet(s) Oral daily  senna 2 Tablet(s) Oral at bedtime  sodium chloride 0.9% lock flush 3 milliLiter(s) IV Push every 8 hours      Labs:  Blood type: O Positive  Rubella IgG: RPR: Negative                          8.4<L>   17.27<H> >-----------< 239    (  @ 20:14 )             24.8<L>                        11.0<L>   9.77 >-----------< 283    (  @ 08:35 )             31.7<L>                  Physical Exam:  General: NAD  Abdomen: Soft, non-tender, non-distended, fundus firm  Vaginal: Lochia wnl  Extremities: No erythema/edema   OB Progress Note:  PPD #2    S: 26yo now PPD #2 after  of mono-di twin at 33w2d uncomplicated delivery with EBL of 300mL. Pt seen and examined at bedside, no acute overnight events. Patient denies current complaints, her pain is well controlled. States she is ambulating, voiding spontaneously, passing gas, and tolerating regular diet. Denies CP, SOB, N/V, HA, blurred vision, epigastric pain.    O:  Vitals:  Vital Signs Last 24 Hrs  T(C): 36.9 (17 Sep 2021 18:53), Max: 36.9 (17 Sep 2021 18:53)  T(F): 98.5 (17 Sep 2021 18:53), Max: 98.5 (17 Sep 2021 18:53)  HR: 98 (17 Sep 2021 18:53) (98 - 98)  BP: 115/66 (17 Sep 2021 18:53) (115/66 - 115/66)  RR: 17 (17 Sep 2021 18:53) (17 - 17)  SpO2: 97% (17 Sep 2021 18:53) (97% - 97%)    MEDICATIONS  (STANDING):  acetaminophen   Tablet .. 975 milliGRAM(s) Oral <User Schedule>  ascorbic acid 500 milliGRAM(s) Oral daily  diphtheria/tetanus/pertussis (acellular) Vaccine (ADAcel) 0.5 milliLiter(s) IntraMuscular once  ferrous    sulfate 325 milliGRAM(s) Oral three times a day  ibuprofen  Tablet. 600 milliGRAM(s) Oral every 6 hours  influenza   Vaccine 0.5 milliLiter(s) IntraMuscular once  prenatal multivitamin 1 Tablet(s) Oral daily  senna 2 Tablet(s) Oral at bedtime  sodium chloride 0.9% lock flush 3 milliLiter(s) IV Push every 8 hours      Labs:  Blood type: O Positive  Rubella IgG: RPR: Negative                          8.4<L>   17.27<H> >-----------< 239    (  @ 20:14 )             24.8<L>                        11.0<L>   9.77 >-----------< 283    (  @ 08:35 )             31.7<L>                  Physical Exam:  General: NAD  Abdomen: Soft, non-tender, non-distended, fundus firm  Vaginal: Lochia wnl  Extremities: No erythema/edema

## 2021-10-05 NOTE — DISCHARGE NOTE ANTEPARTUM - FLU SEASON?
Caller: Trina Dill    Relationship: Self    Best call back number: [phone]    What is the best time to reach you: ANY TIME    Who are you requesting to speak with (clinical staff, provider,  specific staff member): NURSE OR DR JO    Do you know the name of the person who called: SELF    What was the call regarding: PATIENT WOULD LIKE TO KNOW WHY SHE NEEDS AN APPOINTMENT TO GET HER GABAPENTIN REFILLED SINCE SHE WAS JUST IN THE OFFICE IN AUGUST.    Do you require a callback: YES         No

## 2021-10-26 PROBLEM — U07.1 COVID-19: Chronic | Status: ACTIVE | Noted: 2021-09-16

## 2021-10-28 ENCOUNTER — APPOINTMENT (OUTPATIENT)
Dept: OBGYN | Facility: HOSPITAL | Age: 26
End: 2021-10-28

## 2021-11-02 ENCOUNTER — APPOINTMENT (OUTPATIENT)
Dept: OBGYN | Facility: HOSPITAL | Age: 26
End: 2021-11-02

## 2021-11-02 ENCOUNTER — NON-APPOINTMENT (OUTPATIENT)
Age: 26
End: 2021-11-02

## 2021-11-02 LAB — SURGICAL PATHOLOGY STUDY: SIGNIFICANT CHANGE UP

## 2021-11-18 ENCOUNTER — RESULT REVIEW (OUTPATIENT)
Age: 26
End: 2021-11-18

## 2021-11-18 ENCOUNTER — OUTPATIENT (OUTPATIENT)
Dept: OUTPATIENT SERVICES | Facility: HOSPITAL | Age: 26
LOS: 1 days | End: 2021-11-18

## 2021-11-18 ENCOUNTER — APPOINTMENT (OUTPATIENT)
Dept: OBGYN | Facility: HOSPITAL | Age: 26
End: 2021-11-18

## 2021-11-18 VITALS
HEART RATE: 73 BPM | SYSTOLIC BLOOD PRESSURE: 123 MMHG | BODY MASS INDEX: 38.92 KG/M2 | WEIGHT: 248 LBS | DIASTOLIC BLOOD PRESSURE: 63 MMHG | TEMPERATURE: 97.6 F | HEIGHT: 67 IN

## 2021-11-18 DIAGNOSIS — N76.0 ACUTE VAGINITIS: ICD-10-CM

## 2021-11-18 DIAGNOSIS — O30.019 TWIN PREGNANCY, MONOCHORIONIC/MONOAMNIOTIC, UNSPECIFIED TRIMESTER: ICD-10-CM

## 2021-11-18 DIAGNOSIS — Z78.9 OTHER SPECIFIED HEALTH STATUS: ICD-10-CM

## 2021-11-18 DIAGNOSIS — B96.89 ACUTE VAGINITIS: ICD-10-CM

## 2021-11-18 LAB
BASOPHILS # BLD AUTO: 0.03 K/UL — SIGNIFICANT CHANGE UP (ref 0–0.2)
BASOPHILS NFR BLD AUTO: 0.5 % — SIGNIFICANT CHANGE UP (ref 0–2)
EOSINOPHIL # BLD AUTO: 0.14 K/UL — SIGNIFICANT CHANGE UP (ref 0–0.5)
EOSINOPHIL NFR BLD AUTO: 2.6 % — SIGNIFICANT CHANGE UP (ref 0–6)
HCT VFR BLD CALC: 32.7 % — LOW (ref 34.5–45)
HGB BLD-MCNC: 10.3 G/DL — LOW (ref 11.5–15.5)
HIV 1+2 AB+HIV1 P24 AG SERPL QL IA: SIGNIFICANT CHANGE UP
IANC: 2.55 K/UL — SIGNIFICANT CHANGE UP (ref 1.5–8.5)
IMM GRANULOCYTES NFR BLD AUTO: 0.2 % — SIGNIFICANT CHANGE UP (ref 0–1.5)
LYMPHOCYTES # BLD AUTO: 2.33 K/UL — SIGNIFICANT CHANGE UP (ref 1–3.3)
LYMPHOCYTES # BLD AUTO: 42.7 % — SIGNIFICANT CHANGE UP (ref 13–44)
MCHC RBC-ENTMCNC: 30.5 PG — SIGNIFICANT CHANGE UP (ref 27–34)
MCHC RBC-ENTMCNC: 31.5 GM/DL — LOW (ref 32–36)
MCV RBC AUTO: 96.7 FL — SIGNIFICANT CHANGE UP (ref 80–100)
MONOCYTES # BLD AUTO: 0.4 K/UL — SIGNIFICANT CHANGE UP (ref 0–0.9)
MONOCYTES NFR BLD AUTO: 7.3 % — SIGNIFICANT CHANGE UP (ref 2–14)
NEUTROPHILS # BLD AUTO: 2.55 K/UL — SIGNIFICANT CHANGE UP (ref 1.8–7.4)
NEUTROPHILS NFR BLD AUTO: 46.7 % — SIGNIFICANT CHANGE UP (ref 43–77)
NRBC # BLD: 0 /100 WBCS — SIGNIFICANT CHANGE UP
NRBC # FLD: 0 K/UL — SIGNIFICANT CHANGE UP
PLATELET # BLD AUTO: 338 K/UL — SIGNIFICANT CHANGE UP (ref 150–400)
RBC # BLD: 3.38 M/UL — LOW (ref 3.8–5.2)
RBC # FLD: 14.6 % — HIGH (ref 10.3–14.5)
WBC # BLD: 5.46 K/UL — SIGNIFICANT CHANGE UP (ref 3.8–10.5)
WBC # FLD AUTO: 5.46 K/UL — SIGNIFICANT CHANGE UP (ref 3.8–10.5)

## 2021-11-18 RX ORDER — METRONIDAZOLE 500 MG/1
500 TABLET ORAL TWICE DAILY
Qty: 14 | Refills: 0 | Status: ACTIVE | COMMUNITY
Start: 2021-11-18 | End: 1900-01-01

## 2021-11-18 NOTE — HISTORY OF PRESENT ILLNESS
[Delivery Date: ___] : on [unfilled] [] : delivered by vaginal delivery [Multiples: ___] : Delivery History: [unfilled] babies [NICU: ___] : NICU: [unfilled] [Breastfeeding] : currently nursing [BF with Difficulty] : nursing with difficulty [Discharge HCT: ___] : hematocrit level was [unfilled] [Discharge HGB: ___] : hemoglobin level was [unfilled] [Intended Contraception] : Intended Contraception: [Medroxyprogesterone Injection] : medroxyprogesterone acetate injection [___ wks] : is [unfilled] weeks in size [Doing Well] : is doing well [None] : None [Rhogam] : Rhogam was not administered [Rubella Vaccine] : Rubella vaccine was not administered [Pertussis Vaccine] : Pertussis vaccine was not administered [BTL] : no tubal ligation [Resumed Menses] : has not resumed her menses [Resumed Lesslie] : has not resumed intercourse [Back to Normal] : is still enlarged [Cervix Sample Taken] : cervical sample not taken for a Pap smear [FreeTextEntry8] : PP visit s/p mono di   [FreeTextEntry9] : mono-di twins PTL at 33w2d  [de-identified] : HARLAN given  [de-identified] : Breastfeeding specialist in to see patient  [de-identified] : given 9/18/21 [de-identified] : Mother and babies doing well . BV noted on VE. [de-identified] : f/u depo 12/4-12/18. [facility] today. Flagyl rx sent - instructions given.

## 2021-12-16 ENCOUNTER — APPOINTMENT (OUTPATIENT)
Dept: OBGYN | Facility: HOSPITAL | Age: 26
End: 2021-12-16

## 2021-12-16 ENCOUNTER — OUTPATIENT (OUTPATIENT)
Dept: OUTPATIENT SERVICES | Facility: HOSPITAL | Age: 26
LOS: 1 days | End: 2021-12-16

## 2021-12-16 VITALS
WEIGHT: 263 LBS | BODY MASS INDEX: 41.28 KG/M2 | HEIGHT: 67 IN | SYSTOLIC BLOOD PRESSURE: 117 MMHG | DIASTOLIC BLOOD PRESSURE: 64 MMHG | HEART RATE: 79 BPM | TEMPERATURE: 98 F

## 2021-12-16 DIAGNOSIS — Z30.42 ENCOUNTER FOR SURVEILLANCE OF INJECTABLE CONTRACEPTIVE: ICD-10-CM

## 2021-12-16 RX ORDER — MEDROXYPROGESTERONE ACETATE 150 MG/ML
150 INJECTION, SUSPENSION INTRAMUSCULAR
Qty: 0 | Refills: 0 | Status: COMPLETED | OUTPATIENT
Start: 2021-12-16

## 2021-12-16 RX ADMIN — MEDROXYPROGESTERONE ACETATE 0 MG/ML: 150 INJECTION, SUSPENSION INTRAMUSCULAR at 00:00

## 2021-12-22 DIAGNOSIS — Z30.42 ENCOUNTER FOR SURVEILLANCE OF INJECTABLE CONTRACEPTIVE: ICD-10-CM

## 2022-11-03 NOTE — DISCHARGE NOTE OB - PATIENT PORTAL LINK FT
oral You can access the FollowMyHealth Patient Portal offered by [facility] by registering at the following website: [email or web address]. By joining [email or web address]’s FollowMyHealth portal, you will also be able to view your health information using other applications (apps) compatible with our system.

## 2022-11-11 NOTE — DISCHARGE NOTE ANTEPARTUM - REGULAR UTERINE CONTRACTIONS
[facility]hthal[facility]  Ophthalmology  [address] [place] [address], [facility] [address]  [place] [address]  Phone: [phone]  Fax:     
Statement Selected

## 2023-12-05 NOTE — OB PROVIDER DELIVERY SUMMARY - NS_BIRTHTRAUMAB_OBGYN_ALL_OB
I have reviewed the notes, assessments, and/or procedures performed by Kayla Farmer RN, IBCLC, I concur with her/his documentation of Lennie Maradiaga MD 12/04/23 None

## 2023-12-29 NOTE — OB RN PATIENT PROFILE - INFANT HOME WITH MOTHER, OB PROFILE
You were seen and evaluated here in the emergency department today.  I advise that you follow-up with your primary care physician in the next 24 to 72 hours or the clinic listed above in your discharge paperwork as needed.    Take 1000mg of Acetaminophen up to 4 times a day (making sure you don't get more than 4000mg of acetaminophen each day INCLUDING ALL MEDICINES THAT CONTAIN ACETAMINOPHEN).   You may also add 400 mg of ibuprofen 3 times a day as this has been shown to have additional benefit. Usually, these medicine are well-tolerated until the pain is well-controlled.   You may add prescription pain medicines AS PRESCRIBED. These medicines can cause dependence over time, starting in as few as 3 days. They may make driving dangerous, even if you don't feel altered. DO NOT DRIVE FOR 12 HOURS after taking these medicines. Also, these medications often cause constipation. Please keep up with your fiber intake and fluids, and discuss over-the-counter laxitives and stool softeners with a pharmacist or doctor.    Seek immediate medical attention if your pain worsens despite medication, if you're unable to walk, if you have any numbness, or if you have any change in your urinary habits.     
yes

## 2024-02-22 NOTE — DISCHARGE NOTE ANTEPARTUM - MEDICATION SUMMARY - MEDICATIONS TO CHANGE
36.5 I will SWITCH the dose or number of times a day I take the medications listed below when I get home from the hospital:  None